# Patient Record
Sex: FEMALE | Race: WHITE | NOT HISPANIC OR LATINO | Employment: OTHER | URBAN - METROPOLITAN AREA
[De-identification: names, ages, dates, MRNs, and addresses within clinical notes are randomized per-mention and may not be internally consistent; named-entity substitution may affect disease eponyms.]

---

## 2023-11-19 ENCOUNTER — APPOINTMENT (OUTPATIENT)
Dept: RADIOLOGY | Facility: HOSPITAL | Age: 68
DRG: 660 | End: 2023-11-19
Payer: MEDICARE

## 2023-11-19 ENCOUNTER — HOSPITAL ENCOUNTER (INPATIENT)
Facility: HOSPITAL | Age: 68
LOS: 4 days | Discharge: HOME | DRG: 660 | End: 2023-11-23
Attending: EMERGENCY MEDICINE | Admitting: HOSPITALIST
Payer: MEDICARE

## 2023-11-19 DIAGNOSIS — N20.0 NEPHROLITHIASIS: ICD-10-CM

## 2023-11-19 DIAGNOSIS — N20.1 LEFT URETERAL STONE: ICD-10-CM

## 2023-11-19 DIAGNOSIS — I15.9 SECONDARY HYPERTENSION: ICD-10-CM

## 2023-11-19 DIAGNOSIS — N12 PYELONEPHRITIS: Primary | ICD-10-CM

## 2023-11-19 DIAGNOSIS — E03.9 HYPOTHYROIDISM, UNSPECIFIED TYPE: ICD-10-CM

## 2023-11-19 LAB
ALBUMIN SERPL BCP-MCNC: 3.8 G/DL (ref 3.4–5)
ALP SERPL-CCNC: 78 U/L (ref 33–136)
ALT SERPL W P-5'-P-CCNC: 9 U/L (ref 7–45)
ANION GAP SERPL CALC-SCNC: 17 MMOL/L (ref 10–20)
APPEARANCE UR: ABNORMAL
AST SERPL W P-5'-P-CCNC: 12 U/L (ref 9–39)
BACTERIA #/AREA URNS AUTO: ABNORMAL /HPF
BASOPHILS # BLD AUTO: 0.02 X10*3/UL (ref 0–0.1)
BASOPHILS NFR BLD AUTO: 0.2 %
BILIRUB SERPL-MCNC: 0.9 MG/DL (ref 0–1.2)
BILIRUB UR STRIP.AUTO-MCNC: NEGATIVE MG/DL
BUN SERPL-MCNC: 16 MG/DL (ref 6–23)
CALCIUM SERPL-MCNC: 8.7 MG/DL (ref 8.6–10.3)
CARDIAC TROPONIN I PNL SERPL HS: 12 NG/L (ref 0–13)
CHLORIDE SERPL-SCNC: 98 MMOL/L (ref 98–107)
CO2 SERPL-SCNC: 22 MMOL/L (ref 21–32)
COLOR UR: ABNORMAL
CREAT SERPL-MCNC: 0.77 MG/DL (ref 0.5–1.05)
EOSINOPHIL # BLD AUTO: 0 X10*3/UL (ref 0–0.7)
EOSINOPHIL NFR BLD AUTO: 0 %
ERYTHROCYTE [DISTWIDTH] IN BLOOD BY AUTOMATED COUNT: 15.3 % (ref 11.5–14.5)
FLUAV RNA RESP QL NAA+PROBE: NOT DETECTED
FLUBV RNA RESP QL NAA+PROBE: NOT DETECTED
GFR SERPL CREATININE-BSD FRML MDRD: 85 ML/MIN/1.73M*2
GLUCOSE SERPL-MCNC: 131 MG/DL (ref 74–99)
GLUCOSE UR STRIP.AUTO-MCNC: NEGATIVE MG/DL
HCT VFR BLD AUTO: 39.9 % (ref 36–46)
HGB BLD-MCNC: 12.8 G/DL (ref 12–16)
IMM GRANULOCYTES # BLD AUTO: 0.07 X10*3/UL (ref 0–0.7)
IMM GRANULOCYTES NFR BLD AUTO: 0.5 % (ref 0–0.9)
KETONES UR STRIP.AUTO-MCNC: ABNORMAL MG/DL
LACTATE SERPL-SCNC: 1.1 MMOL/L (ref 0.4–2)
LEUKOCYTE ESTERASE UR QL STRIP.AUTO: ABNORMAL
LIPASE SERPL-CCNC: 9 U/L (ref 9–82)
LYMPHOCYTES # BLD AUTO: 0.71 X10*3/UL (ref 1.2–4.8)
LYMPHOCYTES NFR BLD AUTO: 5.5 %
MAGNESIUM SERPL-MCNC: 2 MG/DL (ref 1.6–2.4)
MCH RBC QN AUTO: 26 PG (ref 26–34)
MCHC RBC AUTO-ENTMCNC: 32.1 G/DL (ref 32–36)
MCV RBC AUTO: 81 FL (ref 80–100)
MONOCYTES # BLD AUTO: 1.47 X10*3/UL (ref 0.1–1)
MONOCYTES NFR BLD AUTO: 11.4 %
MUCOUS THREADS #/AREA URNS AUTO: ABNORMAL /LPF
NEUTROPHILS # BLD AUTO: 10.66 X10*3/UL (ref 1.2–7.7)
NEUTROPHILS NFR BLD AUTO: 82.4 %
NITRITE UR QL STRIP.AUTO: NEGATIVE
NRBC BLD-RTO: 0 /100 WBCS (ref 0–0)
PH UR STRIP.AUTO: 5 [PH]
PLATELET # BLD AUTO: 288 X10*3/UL (ref 150–450)
POTASSIUM SERPL-SCNC: 3.7 MMOL/L (ref 3.5–5.3)
PROT SERPL-MCNC: 7.1 G/DL (ref 6.4–8.2)
PROT UR STRIP.AUTO-MCNC: ABNORMAL MG/DL
RBC # BLD AUTO: 4.93 X10*6/UL (ref 4–5.2)
RBC # UR STRIP.AUTO: ABNORMAL /UL
RBC #/AREA URNS AUTO: ABNORMAL /HPF
SARS-COV-2 RNA RESP QL NAA+PROBE: NOT DETECTED
SODIUM SERPL-SCNC: 133 MMOL/L (ref 136–145)
SP GR UR STRIP.AUTO: 1.02
SQUAMOUS #/AREA URNS AUTO: ABNORMAL /HPF
T4 FREE SERPL-MCNC: 0.96 NG/DL (ref 0.61–1.12)
TSH SERPL-ACNC: 4.96 MIU/L (ref 0.44–3.98)
UROBILINOGEN UR STRIP.AUTO-MCNC: 4 MG/DL
WBC # BLD AUTO: 12.9 X10*3/UL (ref 4.4–11.3)
WBC #/AREA URNS AUTO: >50 /HPF
WBC CLUMPS #/AREA URNS AUTO: ABNORMAL /HPF

## 2023-11-19 PROCEDURE — 94760 N-INVAS EAR/PLS OXIMETRY 1: CPT

## 2023-11-19 PROCEDURE — 99285 EMERGENCY DEPT VISIT HI MDM: CPT | Mod: 25 | Performed by: EMERGENCY MEDICINE

## 2023-11-19 PROCEDURE — 71046 X-RAY EXAM CHEST 2 VIEWS: CPT | Mod: FOREIGN READ | Performed by: RADIOLOGY

## 2023-11-19 PROCEDURE — 71046 X-RAY EXAM CHEST 2 VIEWS: CPT | Mod: FY,FR

## 2023-11-19 PROCEDURE — 87636 SARSCOV2 & INF A&B AMP PRB: CPT | Performed by: EMERGENCY MEDICINE

## 2023-11-19 PROCEDURE — 80053 COMPREHEN METABOLIC PANEL: CPT | Performed by: EMERGENCY MEDICINE

## 2023-11-19 PROCEDURE — 87186 SC STD MICRODIL/AGAR DIL: CPT | Mod: AHULAB | Performed by: EMERGENCY MEDICINE

## 2023-11-19 PROCEDURE — 96365 THER/PROPH/DIAG IV INF INIT: CPT

## 2023-11-19 PROCEDURE — 83690 ASSAY OF LIPASE: CPT | Performed by: EMERGENCY MEDICINE

## 2023-11-19 PROCEDURE — 2550000001 HC RX 255 CONTRASTS: Performed by: EMERGENCY MEDICINE

## 2023-11-19 PROCEDURE — 84484 ASSAY OF TROPONIN QUANT: CPT | Performed by: EMERGENCY MEDICINE

## 2023-11-19 PROCEDURE — 2500000004 HC RX 250 GENERAL PHARMACY W/ HCPCS (ALT 636 FOR OP/ED): Performed by: EMERGENCY MEDICINE

## 2023-11-19 PROCEDURE — 83735 ASSAY OF MAGNESIUM: CPT | Performed by: EMERGENCY MEDICINE

## 2023-11-19 PROCEDURE — 74177 CT ABD & PELVIS W/CONTRAST: CPT

## 2023-11-19 PROCEDURE — 83605 ASSAY OF LACTIC ACID: CPT | Performed by: EMERGENCY MEDICINE

## 2023-11-19 PROCEDURE — 1100000001 HC PRIVATE ROOM DAILY

## 2023-11-19 PROCEDURE — 84443 ASSAY THYROID STIM HORMONE: CPT | Performed by: EMERGENCY MEDICINE

## 2023-11-19 PROCEDURE — 85025 COMPLETE CBC W/AUTO DIFF WBC: CPT | Performed by: EMERGENCY MEDICINE

## 2023-11-19 PROCEDURE — 74177 CT ABD & PELVIS W/CONTRAST: CPT | Performed by: RADIOLOGY

## 2023-11-19 PROCEDURE — 36415 COLL VENOUS BLD VENIPUNCTURE: CPT | Performed by: EMERGENCY MEDICINE

## 2023-11-19 PROCEDURE — 87086 URINE CULTURE/COLONY COUNT: CPT | Mod: AHULAB | Performed by: EMERGENCY MEDICINE

## 2023-11-19 PROCEDURE — 81001 URINALYSIS AUTO W/SCOPE: CPT | Performed by: EMERGENCY MEDICINE

## 2023-11-19 PROCEDURE — 99223 1ST HOSP IP/OBS HIGH 75: CPT | Performed by: HOSPITALIST

## 2023-11-19 PROCEDURE — 84439 ASSAY OF FREE THYROXINE: CPT | Performed by: EMERGENCY MEDICINE

## 2023-11-19 RX ORDER — ACETAMINOPHEN 325 MG/1
975 TABLET ORAL ONCE
Status: COMPLETED | OUTPATIENT
Start: 2023-11-19 | End: 2023-11-19

## 2023-11-19 RX ORDER — CEFTRIAXONE 1 G/50ML
1 INJECTION, SOLUTION INTRAVENOUS ONCE
Status: COMPLETED | OUTPATIENT
Start: 2023-11-19 | End: 2023-11-19

## 2023-11-19 RX ORDER — ONDANSETRON HYDROCHLORIDE 2 MG/ML
4 INJECTION, SOLUTION INTRAVENOUS EVERY 6 HOURS PRN
Status: DISCONTINUED | OUTPATIENT
Start: 2023-11-19 | End: 2023-11-23 | Stop reason: HOSPADM

## 2023-11-19 RX ADMIN — ACETAMINOPHEN 975 MG: 325 TABLET ORAL at 16:32

## 2023-11-19 RX ADMIN — IOHEXOL 75 ML: 350 INJECTION, SOLUTION INTRAVENOUS at 18:51

## 2023-11-19 RX ADMIN — CEFTRIAXONE SODIUM 1 G: 1 INJECTION, SOLUTION INTRAVENOUS at 19:10

## 2023-11-19 RX ADMIN — SODIUM CHLORIDE 1000 ML: 9 INJECTION, SOLUTION INTRAVENOUS at 16:31

## 2023-11-19 SDOH — SOCIAL STABILITY: SOCIAL INSECURITY: HAS ANYONE EVER THREATENED TO HURT YOUR FAMILY OR YOUR PETS?: NO

## 2023-11-19 SDOH — SOCIAL STABILITY: SOCIAL INSECURITY: DO YOU FEEL ANYONE HAS EXPLOITED OR TAKEN ADVANTAGE OF YOU FINANCIALLY OR OF YOUR PERSONAL PROPERTY?: NO

## 2023-11-19 SDOH — SOCIAL STABILITY: SOCIAL INSECURITY: ABUSE: ADULT

## 2023-11-19 SDOH — SOCIAL STABILITY: SOCIAL INSECURITY: ARE THERE ANY APPARENT SIGNS OF INJURIES/BEHAVIORS THAT COULD BE RELATED TO ABUSE/NEGLECT?: NO

## 2023-11-19 SDOH — SOCIAL STABILITY: SOCIAL INSECURITY: DOES ANYONE TRY TO KEEP YOU FROM HAVING/CONTACTING OTHER FRIENDS OR DOING THINGS OUTSIDE YOUR HOME?: NO

## 2023-11-19 SDOH — SOCIAL STABILITY: SOCIAL INSECURITY: HAVE YOU HAD THOUGHTS OF HARMING ANYONE ELSE?: NO

## 2023-11-19 SDOH — SOCIAL STABILITY: SOCIAL INSECURITY: DO YOU FEEL UNSAFE GOING BACK TO THE PLACE WHERE YOU ARE LIVING?: NO

## 2023-11-19 SDOH — SOCIAL STABILITY: SOCIAL INSECURITY: WERE YOU ABLE TO COMPLETE ALL THE BEHAVIORAL HEALTH SCREENINGS?: YES

## 2023-11-19 SDOH — SOCIAL STABILITY: SOCIAL INSECURITY: ARE YOU OR HAVE YOU BEEN THREATENED OR ABUSED PHYSICALLY, EMOTIONALLY, OR SEXUALLY BY ANYONE?: NO

## 2023-11-19 ASSESSMENT — COGNITIVE AND FUNCTIONAL STATUS - GENERAL
STANDING UP FROM CHAIR USING ARMS: A LITTLE
MOVING TO AND FROM BED TO CHAIR: A LITTLE
WALKING IN HOSPITAL ROOM: A LOT
DAILY ACTIVITIY SCORE: 24
CLIMB 3 TO 5 STEPS WITH RAILING: A LOT
MOBILITY SCORE: 18
PATIENT BASELINE BEDBOUND: NO

## 2023-11-19 ASSESSMENT — COLUMBIA-SUICIDE SEVERITY RATING SCALE - C-SSRS
2. HAVE YOU ACTUALLY HAD ANY THOUGHTS OF KILLING YOURSELF?: NO
6. HAVE YOU EVER DONE ANYTHING, STARTED TO DO ANYTHING, OR PREPARED TO DO ANYTHING TO END YOUR LIFE?: NO
1. IN THE PAST MONTH, HAVE YOU WISHED YOU WERE DEAD OR WISHED YOU COULD GO TO SLEEP AND NOT WAKE UP?: NO

## 2023-11-19 ASSESSMENT — LIFESTYLE VARIABLES
AUDIT-C TOTAL SCORE: 0
HOW MANY STANDARD DRINKS CONTAINING ALCOHOL DO YOU HAVE ON A TYPICAL DAY: PATIENT DOES NOT DRINK
HOW OFTEN DO YOU HAVE A DRINK CONTAINING ALCOHOL: NEVER
SKIP TO QUESTIONS 9-10: 1
HOW OFTEN DO YOU HAVE 6 OR MORE DRINKS ON ONE OCCASION: NEVER
AUDIT-C TOTAL SCORE: 0

## 2023-11-19 ASSESSMENT — ACTIVITIES OF DAILY LIVING (ADL)
HEARING - LEFT EAR: FUNCTIONAL
FEEDING YOURSELF: INDEPENDENT
PATIENT'S MEMORY ADEQUATE TO SAFELY COMPLETE DAILY ACTIVITIES?: YES
DRESSING YOURSELF: INDEPENDENT
TOILETING: INDEPENDENT
ASSISTIVE_DEVICE: EYEGLASSES
JUDGMENT_ADEQUATE_SAFELY_COMPLETE_DAILY_ACTIVITIES: YES
BATHING: INDEPENDENT
LACK_OF_TRANSPORTATION: NO
HEARING - RIGHT EAR: FUNCTIONAL
GROOMING: INDEPENDENT
WALKS IN HOME: NEEDS ASSISTANCE
ADEQUATE_TO_COMPLETE_ADL: YES

## 2023-11-19 ASSESSMENT — PATIENT HEALTH QUESTIONNAIRE - PHQ9
SUM OF ALL RESPONSES TO PHQ9 QUESTIONS 1 & 2: 0
1. LITTLE INTEREST OR PLEASURE IN DOING THINGS: NOT AT ALL
2. FEELING DOWN, DEPRESSED OR HOPELESS: NOT AT ALL

## 2023-11-19 NOTE — ED PROVIDER NOTES
HPI   Chief Complaint   Patient presents with    Nausea     Pt has been having nausea and vomiting, feeling ill, hot and cold, started Friday. A&ox3.        HPI  Patient is a 68-year-old female with past medical history significant for anemia who presented to the emergency room with multiple complaints.  She states that since Friday she has felt generally ill.  She has been too weak to get out of bed.  She has nausea and vomiting that is nonbloody.  Last night she had a bowel movement that was nonbloody and nonmelanotic.  She has not checked for fever but has felt hot and cold.  She feels achy all over including her back.  She has some shortness of breath even at rest that is mild but denies any chest pain.  She has no cough, rhinorrhea.  She denies any dysuria or hematuria but states that her primary care doctor ordered a CT for her the other day to see if she had kidney stones.  She does not know the results.  At that time she was having increased frequency.  This was before she started having the symptoms she is describing today.      PMHx: As above  PSHx: Denies pertinent  FamilyHx: Denies  SocialHx: Denies  Allergies: NKDA  Medications: See Medication Reconciliation     ROS  As above but otherwise denies    Physical Exam    GENERAL: Awake and Alert, No Acute Distress  HEENT: AT/NC, PERRL, EOMI, Normal Oropharynx, No Signs of Dehydration  NECK: Normal Inspection, No JVD  CARDIOVASCULAR: TRR, No M/R/G  RESPIRATORY: CTA Bilaterally, No Wheezes, Rales or Rhonchi, Chest Wall Non-tender  ABDOMEN: Soft, non-tender abdomen, Normal Bowel Sounds, No Distention  BACK: No CVA Tenderness  SKIN: Normal Color, Warm, Dry, No Rashes   EXTREMITIES: Non-Tender, Full ROM, No Pedal Edema  NEURO: A&O x 3, Normal Motor and Sensation, Normal Mood and Affect    Nursing Assessment and Vitals Reviewed    EKG showed a sinus rhythm at 119 bpm.  There are no significant interval prolongations or ischemic ST changes.  No T wave inversions or  axis deviation.    Medical Decision  Patient is seen and evaluated for multiple symptoms as described above.  Physical exam reveals a tired but nontoxic-appearing female in no acute distress.  Lungs are clear and heart is tachycardic but regular.  Abdomen is soft, nontender nondistended.  She has some discomfort to the flanks.  Patient is started on IV fluids due to concern for severe nausea and vomiting.  Will obtain blood work, COVID swabs to evaluate for possible etiology of symptoms.  Additionally, patient is experiencing chills, extreme fatigue, back pain and is tachycardic with a low-grade temperature.  Concern for possible Harmeet so we will add a CT of the abdomen and pelvis.    Work-up for patient included labs that revealed mild hyponatremia.  TSH is elevated but T4 is within normal limits.  She has mild leukocytosis with a left shift and her urine does appear infected so she is started on IV antibiotics.  CT of the abdomen and pelvis showed multiple perinephric no which she has areas of hypo enhancement concerning for pyelonephritis.  Additionally, she has nonobstructing calculi in the left distal ureter as well as others.  Bladder is concerning for infectious cystitis.  She also has diverticulosis without diverticulitis and cholelithiasis.  Patient has remained in stable condition while in the emergency room.  She will be admitted for further work-up and management.                                No data recorded                Patient History   No past medical history on file.  No past surgical history on file.  No family history on file.  Social History     Tobacco Use    Smoking status: Not on file    Smokeless tobacco: Not on file   Substance Use Topics    Alcohol use: Not on file    Drug use: Not on file       Physical Exam   ED Triage Vitals   Temp Heart Rate Resp BP   11/19/23 1308 11/19/23 1307 11/19/23 1307 11/19/23 1307   37.7 °C (99.8 °F) (!) 118 18 (!) 147/97      SpO2 Temp src Heart Rate Source  Patient Position   11/19/23 1307 -- -- --   96 %         BP Location FiO2 (%)     -- --             Physical Exam    ED Course & MDM        Medical Decision Making      Procedure  Procedures     Franchesca Mcbride MD  11/19/23 0832

## 2023-11-20 PROBLEM — I10 HTN (HYPERTENSION): Status: ACTIVE | Noted: 2023-11-20

## 2023-11-20 PROBLEM — E03.9 HYPOTHYROIDISM: Status: ACTIVE | Noted: 2023-11-20

## 2023-11-20 LAB
ANION GAP SERPL CALC-SCNC: 13 MMOL/L (ref 10–20)
BUN SERPL-MCNC: 12 MG/DL (ref 6–23)
CALCIUM SERPL-MCNC: 8 MG/DL (ref 8.6–10.3)
CHLORIDE SERPL-SCNC: 103 MMOL/L (ref 98–107)
CO2 SERPL-SCNC: 23 MMOL/L (ref 21–32)
CREAT SERPL-MCNC: 0.63 MG/DL (ref 0.5–1.05)
ERYTHROCYTE [DISTWIDTH] IN BLOOD BY AUTOMATED COUNT: 15.2 % (ref 11.5–14.5)
GFR SERPL CREATININE-BSD FRML MDRD: >90 ML/MIN/1.73M*2
GLUCOSE BLD MANUAL STRIP-MCNC: 135 MG/DL (ref 74–99)
GLUCOSE SERPL-MCNC: 133 MG/DL (ref 74–99)
HCT VFR BLD AUTO: 42.3 % (ref 36–46)
HGB BLD-MCNC: 13 G/DL (ref 12–16)
MCH RBC QN AUTO: 25.7 PG (ref 26–34)
MCHC RBC AUTO-ENTMCNC: 30.7 G/DL (ref 32–36)
MCV RBC AUTO: 84 FL (ref 80–100)
NRBC BLD-RTO: 0 /100 WBCS (ref 0–0)
PLATELET # BLD AUTO: 248 X10*3/UL (ref 150–450)
POTASSIUM SERPL-SCNC: 3.4 MMOL/L (ref 3.5–5.3)
RBC # BLD AUTO: 5.05 X10*6/UL (ref 4–5.2)
SODIUM SERPL-SCNC: 136 MMOL/L (ref 136–145)
WBC # BLD AUTO: 11.1 X10*3/UL (ref 4.4–11.3)

## 2023-11-20 PROCEDURE — 2500000001 HC RX 250 WO HCPCS SELF ADMINISTERED DRUGS (ALT 637 FOR MEDICARE OP): Performed by: HOSPITALIST

## 2023-11-20 PROCEDURE — 1100000001 HC PRIVATE ROOM DAILY

## 2023-11-20 PROCEDURE — 80048 BASIC METABOLIC PNL TOTAL CA: CPT | Performed by: HOSPITALIST

## 2023-11-20 PROCEDURE — 2500000004 HC RX 250 GENERAL PHARMACY W/ HCPCS (ALT 636 FOR OP/ED): Performed by: INTERNAL MEDICINE

## 2023-11-20 PROCEDURE — 99232 SBSQ HOSP IP/OBS MODERATE 35: CPT | Performed by: INTERNAL MEDICINE

## 2023-11-20 PROCEDURE — 94760 N-INVAS EAR/PLS OXIMETRY 1: CPT

## 2023-11-20 PROCEDURE — 36415 COLL VENOUS BLD VENIPUNCTURE: CPT | Performed by: HOSPITALIST

## 2023-11-20 PROCEDURE — 85027 COMPLETE CBC AUTOMATED: CPT | Performed by: HOSPITALIST

## 2023-11-20 PROCEDURE — 2500000001 HC RX 250 WO HCPCS SELF ADMINISTERED DRUGS (ALT 637 FOR MEDICARE OP): Performed by: INTERNAL MEDICINE

## 2023-11-20 PROCEDURE — 2500000004 HC RX 250 GENERAL PHARMACY W/ HCPCS (ALT 636 FOR OP/ED): Performed by: HOSPITALIST

## 2023-11-20 PROCEDURE — 99221 1ST HOSP IP/OBS SF/LOW 40: CPT | Performed by: NURSE PRACTITIONER

## 2023-11-20 PROCEDURE — C9113 INJ PANTOPRAZOLE SODIUM, VIA: HCPCS | Performed by: HOSPITALIST

## 2023-11-20 PROCEDURE — 82947 ASSAY GLUCOSE BLOOD QUANT: CPT

## 2023-11-20 RX ORDER — TALC
3 POWDER (GRAM) TOPICAL DAILY
Status: DISCONTINUED | OUTPATIENT
Start: 2023-11-20 | End: 2023-11-23 | Stop reason: HOSPADM

## 2023-11-20 RX ORDER — MORPHINE SULFATE 2 MG/ML
2 INJECTION, SOLUTION INTRAMUSCULAR; INTRAVENOUS EVERY 4 HOURS PRN
Status: DISCONTINUED | OUTPATIENT
Start: 2023-11-20 | End: 2023-11-23 | Stop reason: HOSPADM

## 2023-11-20 RX ORDER — ONDANSETRON HYDROCHLORIDE 2 MG/ML
4 INJECTION, SOLUTION INTRAVENOUS EVERY 8 HOURS PRN
Status: DISCONTINUED | OUTPATIENT
Start: 2023-11-20 | End: 2023-11-23 | Stop reason: HOSPADM

## 2023-11-20 RX ORDER — CEFTRIAXONE 1 G/50ML
1 INJECTION, SOLUTION INTRAVENOUS EVERY 24 HOURS
Status: DISCONTINUED | OUTPATIENT
Start: 2023-11-20 | End: 2023-11-20

## 2023-11-20 RX ORDER — ONDANSETRON 4 MG/1
4 TABLET, ORALLY DISINTEGRATING ORAL EVERY 8 HOURS PRN
Status: DISCONTINUED | OUTPATIENT
Start: 2023-11-20 | End: 2023-11-23 | Stop reason: HOSPADM

## 2023-11-20 RX ORDER — TAMSULOSIN HYDROCHLORIDE 0.4 MG/1
0.4 CAPSULE ORAL DAILY
Status: DISCONTINUED | OUTPATIENT
Start: 2023-11-20 | End: 2023-11-23 | Stop reason: HOSPADM

## 2023-11-20 RX ORDER — ACETAMINOPHEN 325 MG/1
650 TABLET ORAL EVERY 4 HOURS PRN
Status: DISCONTINUED | OUTPATIENT
Start: 2023-11-20 | End: 2023-11-23 | Stop reason: HOSPADM

## 2023-11-20 RX ORDER — KETOROLAC TROMETHAMINE 30 MG/ML
15 INJECTION, SOLUTION INTRAMUSCULAR; INTRAVENOUS ONCE
Status: COMPLETED | OUTPATIENT
Start: 2023-11-20 | End: 2023-11-20

## 2023-11-20 RX ORDER — SODIUM CHLORIDE 9 MG/ML
125 INJECTION, SOLUTION INTRAVENOUS CONTINUOUS
Status: DISCONTINUED | OUTPATIENT
Start: 2023-11-20 | End: 2023-11-21

## 2023-11-20 RX ORDER — POLYETHYLENE GLYCOL 3350 17 G/17G
17 POWDER, FOR SOLUTION ORAL DAILY
Status: DISCONTINUED | OUTPATIENT
Start: 2023-11-20 | End: 2023-11-23 | Stop reason: HOSPADM

## 2023-11-20 RX ORDER — PANTOPRAZOLE SODIUM 40 MG/10ML
40 INJECTION, POWDER, LYOPHILIZED, FOR SOLUTION INTRAVENOUS DAILY
Status: DISCONTINUED | OUTPATIENT
Start: 2023-11-20 | End: 2023-11-23 | Stop reason: HOSPADM

## 2023-11-20 RX ORDER — POTASSIUM CHLORIDE 1.5 G/1.58G
40 POWDER, FOR SOLUTION ORAL ONCE
Status: COMPLETED | OUTPATIENT
Start: 2023-11-20 | End: 2023-11-20

## 2023-11-20 RX ORDER — MORPHINE SULFATE 2 MG/ML
1 INJECTION, SOLUTION INTRAMUSCULAR; INTRAVENOUS EVERY 4 HOURS PRN
Status: DISCONTINUED | OUTPATIENT
Start: 2023-11-20 | End: 2023-11-23 | Stop reason: HOSPADM

## 2023-11-20 RX ORDER — LEVOTHYROXINE SODIUM 25 UG/1
25 TABLET ORAL DAILY
Status: DISCONTINUED | OUTPATIENT
Start: 2023-11-20 | End: 2023-11-23 | Stop reason: HOSPADM

## 2023-11-20 RX ADMIN — ACETAMINOPHEN 650 MG: 325 TABLET ORAL at 09:58

## 2023-11-20 RX ADMIN — LEVOTHYROXINE SODIUM 25 MCG: 0.03 TABLET ORAL at 05:33

## 2023-11-20 RX ADMIN — POTASSIUM CHLORIDE 40 MEQ: 1.5 POWDER, FOR SOLUTION ORAL at 09:58

## 2023-11-20 RX ADMIN — Medication 3 MG: at 00:57

## 2023-11-20 RX ADMIN — CEFTRIAXONE 2 G: 2 INJECTION, POWDER, FOR SOLUTION INTRAMUSCULAR; INTRAVENOUS at 11:39

## 2023-11-20 RX ADMIN — SODIUM CHLORIDE 200 ML/HR: 9 INJECTION, SOLUTION INTRAVENOUS at 19:31

## 2023-11-20 RX ADMIN — KETOROLAC TROMETHAMINE 15 MG: 30 INJECTION, SOLUTION INTRAMUSCULAR; INTRAVENOUS at 19:29

## 2023-11-20 RX ADMIN — POLYETHYLENE GLYCOL 3350 17 G: 17 POWDER, FOR SOLUTION ORAL at 09:58

## 2023-11-20 RX ADMIN — SODIUM CHLORIDE 125 ML/HR: 9 INJECTION, SOLUTION INTRAVENOUS at 00:31

## 2023-11-20 RX ADMIN — Medication 3 MG: at 19:51

## 2023-11-20 RX ADMIN — SODIUM CHLORIDE 200 ML/HR: 9 INJECTION, SOLUTION INTRAVENOUS at 14:19

## 2023-11-20 RX ADMIN — PANTOPRAZOLE SODIUM 40 MG: 40 INJECTION, POWDER, FOR SOLUTION INTRAVENOUS at 09:59

## 2023-11-20 RX ADMIN — ACETAMINOPHEN 650 MG: 325 TABLET ORAL at 00:57

## 2023-11-20 RX ADMIN — TAMSULOSIN HYDROCHLORIDE 0.4 MG: 0.4 CAPSULE ORAL at 09:59

## 2023-11-20 RX ADMIN — ACETAMINOPHEN 650 MG: 325 TABLET ORAL at 14:19

## 2023-11-20 ASSESSMENT — COGNITIVE AND FUNCTIONAL STATUS - GENERAL
MOBILITY SCORE: 22
DAILY ACTIVITIY SCORE: 24
MOBILITY SCORE: 18
STANDING UP FROM CHAIR USING ARMS: A LITTLE
CLIMB 3 TO 5 STEPS WITH RAILING: A LOT
WALKING IN HOSPITAL ROOM: A LOT
DAILY ACTIVITIY SCORE: 24
CLIMB 3 TO 5 STEPS WITH RAILING: A LOT
MOVING TO AND FROM BED TO CHAIR: A LITTLE

## 2023-11-20 ASSESSMENT — ENCOUNTER SYMPTOMS
EYES NEGATIVE: 1
MUSCULOSKELETAL NEGATIVE: 1
ABDOMINAL DISTENTION: 0
RESPIRATORY NEGATIVE: 1
WEAKNESS: 1
HEMATURIA: 0
CHILLS: 1
SHORTNESS OF BREATH: 0
GASTROINTESTINAL NEGATIVE: 1
APPETITE CHANGE: 1
FATIGUE: 1
FEVER: 1
DIAPHORESIS: 0
DIARRHEA: 0
VOMITING: 0
WOUND: 0
HEMATOLOGIC/LYMPHATIC NEGATIVE: 1
FLANK PAIN: 0
ABDOMINAL PAIN: 0
DYSURIA: 0
CARDIOVASCULAR NEGATIVE: 1
COUGH: 0
NAUSEA: 1
ARTHRALGIAS: 0
DIZZINESS: 0
CONSTIPATION: 0
ALLERGIC/IMMUNOLOGIC NEGATIVE: 1
HEADACHES: 0
PSYCHIATRIC NEGATIVE: 1
LIGHT-HEADEDNESS: 0
DIFFICULTY URINATING: 0
FREQUENCY: 0
NEUROLOGICAL NEGATIVE: 1

## 2023-11-20 ASSESSMENT — PAIN - FUNCTIONAL ASSESSMENT
PAIN_FUNCTIONAL_ASSESSMENT: 0-10

## 2023-11-20 ASSESSMENT — PAIN SCALES - GENERAL
PAINLEVEL_OUTOF10: 8
PAINLEVEL_OUTOF10: 0 - NO PAIN
PAINLEVEL_OUTOF10: 6
PAINLEVEL_OUTOF10: 3

## 2023-11-20 ASSESSMENT — ACTIVITIES OF DAILY LIVING (ADL): LACK_OF_TRANSPORTATION: NO

## 2023-11-20 NOTE — PROGRESS NOTES
"Tabatha Pascal is a 68 y.o. female on day 1 of admission presenting with Pyelonephritis.    Assessment/Plan        Principal Problem:    Pyelonephritis  Active Problems:    Nephrolithiasis    Diabetes (CMS/HCC)    HTN (hypertension)    Hypothyroidism  - rocephin 2 g  - no stones are clearly obstructing, but will do IV fluids and flomax  - pain control  - urology consulted            Subjective   Still with pelvic and flank pain; doesn't feel much better yet.        Objective     Physical Exam  Cardiovascular:      Rate and Rhythm: Normal rate and regular rhythm.      Heart sounds: Normal heart sounds.   Pulmonary:      Breath sounds: Normal breath sounds.   Abdominal:      General: Bowel sounds are normal.      Palpations: Abdomen is soft.   Musculoskeletal:         General: Normal range of motion.   Neurological:      General: No focal deficit present.      Mental Status: She is alert and oriented to person, place, and time.   Psychiatric:         Mood and Affect: Mood normal.         Last Recorded Vitals  Blood pressure 117/76, pulse 85, temperature 36.1 °C (96.9 °F), temperature source Temporal, resp. rate 20, height 1.676 m (5' 6\"), weight 74.8 kg (165 lb), SpO2 97 %.  Intake/Output last 3 Shifts:  I/O last 3 completed shifts:  In: 300 (4 mL/kg) [P.O.:300]  Out: - (0 mL/kg)   Weight: 74.8 kg                  I spent 40 minutes in the professional and overall care of this patient.      Domingo Fournier MD      "

## 2023-11-20 NOTE — CONSULTS
"Reason For Consult  Kidney stones    History Of Present Illness  Tabatha Pascal is a 68 y.o. female presenting with fatigue, subjective fever, chills, nausea and urinary frequency that started on Friday. She has hx of kidney stones in the past but is unsure of whether or not she needed intervention for it.  She follows with Dr Castillo and is schedule to see him on the 28th for her annual follow up. She denies pain, CP, SOB, vomiting, dysuria, hematuria and frequency. Ct scan shows multiple wedge shaped areas of hypoenhancement, perinephric stranding, Findings consistent with acute pyelonephritis, as well as, 3-4mm non obstructing calculus in the left distal ureter.\" WBC on admission 12.9, UA + for Lg LE, small blood, 4+ bacteria, culture pending, and tachy in 130s. CR is WNL      Past Medical History  HTN, DM, hypothyroidism, and nephrolithiasis.     Surgical History  She has no past surgical history on file.     Social History  She reports that she has never smoked. She has never used smokeless tobacco. No history on file for alcohol use and drug use.    Family History  No family history on file.     Allergies  Patient has no known allergies.    Review of Systems  Review of Systems   Constitutional:  Positive for chills, fatigue and fever. Negative for diaphoresis.   HENT:  Negative for congestion.    Respiratory:  Negative for cough and shortness of breath.    Cardiovascular:  Negative for chest pain and leg swelling.   Gastrointestinal:  Positive for nausea. Negative for abdominal distention, abdominal pain, constipation, diarrhea and vomiting.   Genitourinary:  Positive for urgency. Negative for decreased urine volume, difficulty urinating, dysuria, flank pain, frequency and hematuria.   Musculoskeletal:  Negative for arthralgias.   Skin:  Negative for rash and wound.   Neurological:  Positive for weakness. Negative for dizziness, light-headedness and headaches.         Physical Exam  Physical " "Exam:  Constitutional: ill appearing, Well developed, awake/alert/oriented x3, no distress, alert and cooperative. Sitting up in bed  Skin: Warm and dry, no lesions, no rashes  Eyes: PEERLA  ENMT: mucous membranes moist, no apparent injury, no lesions seen  Head/Neck: Neck supple, no apparent injury, trachea midline  Respiratory/Thorax: Patent airways, CTAB, normal breath sounds with good chest expansion, thorax symmetric  Cardiovascular: Regular, rate and rhythm, 2+ equal pulses of the extremities  Gastrointestinal: Non-distended, soft, non-tender, +BS  Genitourinary: voiding without difficulty, non distended bladder  Musculoskeletal: ROM intact, no joint swelling, normal strength.   Extremities: no cyanosis edema, contusions or wounds   Neurological: alert and oriented x3, intact senses  Psychological: Appropriate mood and behavior.      Last Recorded Vitals  Blood pressure 117/76, pulse 85, temperature 36.1 °C (96.9 °F), temperature source Temporal, resp. rate 20, height 1.676 m (5' 6\"), weight 74.8 kg (165 lb), SpO2 97 %.    Relevant Results  Results for orders placed or performed during the hospital encounter of 11/19/23 (from the past 24 hour(s))   CBC and Auto Differential   Result Value Ref Range    WBC 12.9 (H) 4.4 - 11.3 x10*3/uL    nRBC 0.0 0.0 - 0.0 /100 WBCs    RBC 4.93 4.00 - 5.20 x10*6/uL    Hemoglobin 12.8 12.0 - 16.0 g/dL    Hematocrit 39.9 36.0 - 46.0 %    MCV 81 80 - 100 fL    MCH 26.0 26.0 - 34.0 pg    MCHC 32.1 32.0 - 36.0 g/dL    RDW 15.3 (H) 11.5 - 14.5 %    Platelets 288 150 - 450 x10*3/uL    Neutrophils % 82.4 40.0 - 80.0 %    Immature Granulocytes %, Automated 0.5 0.0 - 0.9 %    Lymphocytes % 5.5 13.0 - 44.0 %    Monocytes % 11.4 2.0 - 10.0 %    Eosinophils % 0.0 0.0 - 6.0 %    Basophils % 0.2 0.0 - 2.0 %    Neutrophils Absolute 10.66 (H) 1.20 - 7.70 x10*3/uL    Immature Granulocytes Absolute, Automated 0.07 0.00 - 0.70 x10*3/uL    Lymphocytes Absolute 0.71 (L) 1.20 - 4.80 x10*3/uL    " Monocytes Absolute 1.47 (H) 0.10 - 1.00 x10*3/uL    Eosinophils Absolute 0.00 0.00 - 0.70 x10*3/uL    Basophils Absolute 0.02 0.00 - 0.10 x10*3/uL   Magnesium   Result Value Ref Range    Magnesium 2.00 1.60 - 2.40 mg/dL   Comprehensive metabolic panel   Result Value Ref Range    Glucose 131 (H) 74 - 99 mg/dL    Sodium 133 (L) 136 - 145 mmol/L    Potassium 3.7 3.5 - 5.3 mmol/L    Chloride 98 98 - 107 mmol/L    Bicarbonate 22 21 - 32 mmol/L    Anion Gap 17 10 - 20 mmol/L    Urea Nitrogen 16 6 - 23 mg/dL    Creatinine 0.77 0.50 - 1.05 mg/dL    eGFR 85 >60 mL/min/1.73m*2    Calcium 8.7 8.6 - 10.3 mg/dL    Albumin 3.8 3.4 - 5.0 g/dL    Alkaline Phosphatase 78 33 - 136 U/L    Total Protein 7.1 6.4 - 8.2 g/dL    AST 12 9 - 39 U/L    Bilirubin, Total 0.9 0.0 - 1.2 mg/dL    ALT 9 7 - 45 U/L   Lactate   Result Value Ref Range    Lactate 1.1 0.4 - 2.0 mmol/L   Lipase   Result Value Ref Range    Lipase 9 9 - 82 U/L   TSH with reflex to Free T4 if abnormal   Result Value Ref Range    Thyroid Stimulating Hormone 4.96 (H) 0.44 - 3.98 mIU/L   Troponin I, High Sensitivity   Result Value Ref Range    Troponin I, High Sensitivity 12 0 - 13 ng/L   Thyroxine, Free   Result Value Ref Range    Thyroxine, Free 0.96 0.61 - 1.12 ng/dL   SARS-CoV-2 RT PCR   Result Value Ref Range    Coronavirus 2019, PCR Not Detected Not Detected   Influenza A, and B PCR   Result Value Ref Range    Flu A Result Not Detected Not Detected    Flu B Result Not Detected Not Detected   Urinalysis with Reflex Microscopic and Culture   Result Value Ref Range    Color, Urine Idania (N) Straw, Yellow    Appearance, Urine Cloudy (N) Clear    Specific Gravity, Urine 1.016 1.005 - 1.035    pH, Urine 5.0 5.0, 5.5, 6.0, 6.5, 7.0, 7.5, 8.0    Protein, Urine 100 (2+) (N) NEGATIVE mg/dL    Glucose, Urine NEGATIVE NEGATIVE mg/dL    Blood, Urine SMALL (1+) (A) NEGATIVE    Ketones, Urine 20 (1+) (A) NEGATIVE mg/dL    Bilirubin, Urine NEGATIVE NEGATIVE    Urobilinogen, Urine 4.0  (N) <2.0 mg/dL    Nitrite, Urine NEGATIVE NEGATIVE    Leukocyte Esterase, Urine LARGE (3+) (A) NEGATIVE   Microscopic Only, Urine   Result Value Ref Range    WBC, Urine >50 (A) 1-5, NONE /HPF    WBC Clumps, Urine MANY Reference range not established. /HPF    RBC, Urine 6-10 (A) NONE, 1-2, 3-5 /HPF    Squamous Epithelial Cells, Urine 1-9 (SPARSE) Reference range not established. /HPF    Bacteria, Urine 4+ (A) NONE SEEN /HPF    Mucus, Urine 3+ Reference range not established. /LPF   CBC   Result Value Ref Range    WBC 11.1 4.4 - 11.3 x10*3/uL    nRBC 0.0 0.0 - 0.0 /100 WBCs    RBC 5.05 4.00 - 5.20 x10*6/uL    Hemoglobin 13.0 12.0 - 16.0 g/dL    Hematocrit 42.3 36.0 - 46.0 %    MCV 84 80 - 100 fL    MCH 25.7 (L) 26.0 - 34.0 pg    MCHC 30.7 (L) 32.0 - 36.0 g/dL    RDW 15.2 (H) 11.5 - 14.5 %    Platelets 248 150 - 450 x10*3/uL   Basic metabolic panel   Result Value Ref Range    Glucose 133 (H) 74 - 99 mg/dL    Sodium 136 136 - 145 mmol/L    Potassium 3.4 (L) 3.5 - 5.3 mmol/L    Chloride 103 98 - 107 mmol/L    Bicarbonate 23 21 - 32 mmol/L    Anion Gap 13 10 - 20 mmol/L    Urea Nitrogen 12 6 - 23 mg/dL    Creatinine 0.63 0.50 - 1.05 mg/dL    eGFR >90 >60 mL/min/1.73m*2    Calcium 8.0 (L) 8.6 - 10.3 mg/dL   POCT GLUCOSE   Result Value Ref Range    POCT Glucose 135 (H) 74 - 99 mg/dL     CT abdomen pelvis w IV contrast    Result Date: 11/19/2023  Interpreted By:  Lis Jackson, STUDY: CT ABDOMEN PELVIS W IV CONTRAST;  11/19/2023 7:09 pm   INDICATION: Signs/Symptoms:concern for pyelo.   COMPARISON: CT scan of the abdomen pelvis 06/11/2020   ACCESSION NUMBER(S): [id number]   ORDERING CLINICIAN: VEE WEBBER   TECHNIQUE: Axial CT images of the abdomen and pelvis with coronal and sagittal reconstructed images obtained after intravenous administration of 75 mL of Omnipaque 350.   FINDINGS: LOWER CHEST: Bibasilar atelectasis and or scarring. Large hiatal hernia with significant portion of the stomach  intrathoracic in location.   ABDOMEN:   LIVER: Within normal limits. BILE DUCTS: Normal caliber. GALLBLADDER: Cholelithiasis. PANCREAS: Within normal limits. SPLEEN: Within normal limits. ADRENALS: Within normal limits. KIDNEYS and URETERS: Bilateral kidneys demonstrate wedge-shaped areas of hypoenhancement with perinephric inflammatory stranding. Findings are concerning for acute pyelonephritis. No hydronephrosis or hydroureter. There is a 2-3 mm nonobstructing calculus in the interpolar region of the left kidney. A 4 mm nonobstructing calculus is noted in the left distal ureter. No evidence for right renal or ureteral calculi.   VESSELS:  Calcific atherosclerosis of the aortoiliac vessels. No aortic aneurysm. RETROPERITONEUM: No pathologically enlarged retroperitoneal lymph nodes.   PELVIS:   REPRODUCTIVE ORGANS: Uterus is present. No adnexal mass. BLADDER: Bladder is partially distended with mild wall thickening and several locules of air. Subtle perivesical stranding   BOWEL: Large hiatal hernia. Visualized loops of bowel are without evidence for obstruction. Moderate stool burden. Scattered colonic diverticula. No evidence for acute diverticulitis. No pneumatosis or portal venous gas. Normal appendix. PERITONEUM: No ascites or free air, no fluid collection.   ABDOMINAL WALL: Small umbilical hernia contains fat. Patulous inguinal canals containing fat. BONES: Multilevel degenerative changes of the spine. Grade 1 anterolisthesis of L4 on 5.       There are multiple peripheral wedge-shaped areas of hypoenhancement bilateral kidneys with perinephric inflammatory stranding. Mild urothelial thickening. Findings concerning for acute pyelonephritis.   There is a 3-4 mm nonobstructing calculus in the left distal ureter. Additional punctate 2-3 mm calculus is noted in the interpolar region of the left kidney.   Bladder is partially distended with mild wall thickening and subtle perivesical stranding. Subtle tiny locules  of air are noted in the bladder. Correlate for history of any recent instrumentation. In the absence of such history findings are concerning for infectious cystitis. Correlate with urinalysis.   Diverticulosis without evidence for acute diverticulitis.   Cholelithiasis.   Additional findings as described above.   MACRO: None   Signed by: Lis Jackson 11/19/2023 7:39 PM Dictation workstation:   [id number]    XR chest 2 views    Result Date: 11/19/2023  STUDY: Chest Radiographs;  11/19/2023, 3:18PM. INDICATION: Shortness of breath. COMPARISON: CXR 6/11/2020. ACCESSION NUMBER(S): [id number] ORDERING CLINICIAN: VEE WEBBER TECHNIQUE:  Frontal and lateral chest. FINDINGS: CARDIOMEDIASTINAL SILHOUETTE: Cardiomediastinal silhouette is normal in size and configuration.  LUNGS: Lungs are clear.  ABDOMEN: No remarkable upper abdominal findings.  BONES: No acute osseous changes.    No acute pulmonary pathology. Signed by Blaine Garcias MD        Assessment/Plan     Tabatha Pascal is a 67 yo female who presents with chief complaint of not feeling well, fever, chills, fatigue and urinary urgency. On exam, she is ill-appearing, abdomen and bladder are no distended, no tenderness with palpation.     Impression/Plan: Pyelonephritis/ UTI/non obstructing kidney stone   - May eat today  - NPO after midnight  - CT scan tomorrow to evaluate stone   - continue abx for UTI/ pyelo  - IVF  - continue flomax  - pain and nausea medications as needed  - repeat labs in the AM  - supportive care per primary team    Dispo: Repeat CT scan in the morning to evaluate the location of the stone. This along with her labs and exam will determine need for stent placement tomorrow. NPO after MN. Urology will continue to follow. Discussed with Dr Jerica Abdi    I spent 45 minutes in the professional and overall care of this patient.      ТАТЬЯНА Barnett-CNP

## 2023-11-20 NOTE — H&P
History Of Present Illness  Tabatha Pascal is a 68 y.o. female with a PMH of hypothyroidism, HLD, GERD, DM2, HTN, presenting with fever, chills, dec appetite.  Symptoms started 3 days PTA, on Friday.   Started having fever and shaking chills on Friday; the following day developed nausea and dry heaves.   Did not have urinary frequency or dysuria, however, did have urgency with a rush to get to the toilet.   No gross blood in the urine, however, she felt it looked darker than normal.   Has had no appetite, has not eaten in days.     She also reports that she has not been taking any of her medication for months. States she feels better off the medications.   Has a hx of kidney stones, for which she sees a Urologist Dr Brown at [facility].     Work up in the ED shows   Elevated WBC ct of 12.9  Cr WNL  UA- large LE, 4+ bacteria    CT A/P shows bladder wall thickening, findings c/w pyelonephritis, non-obstructing stone distal left ureter.     Past Medical History  No past medical history on file.    Surgical History  No past surgical history on file.     Social History  She has no history on file for tobacco use, alcohol use, and drug use.    Family History  No family history on file.     Allergies  Patient has no known allergies.    Review of Systems   Constitutional:  Positive for appetite change, chills and fever.   HENT: Negative.     Eyes: Negative.    Respiratory: Negative.     Cardiovascular: Negative.    Gastrointestinal: Negative.    Genitourinary:  Positive for urgency. Negative for dysuria, flank pain and frequency.   Musculoskeletal: Negative.    Skin: Negative.    Allergic/Immunologic: Negative.    Neurological: Negative.    Hematological: Negative.    Psychiatric/Behavioral: Negative.          Physical Exam  Vitals reviewed.   Constitutional:       Comments: Pt lying in fetal position, left lateral decubitus position, is shaking, states she does not feel well. Attempts to vomit several times while I am  "speaking with her, however, is not able to vomit.      HENT:      Head: Normocephalic and atraumatic.      Mouth/Throat:      Mouth: Mucous membranes are moist.   Eyes:      Extraocular Movements: Extraocular movements intact.      Conjunctiva/sclera: Conjunctivae normal.      Pupils: Pupils are equal, round, and reactive to light.   Cardiovascular:      Rate and Rhythm: Normal rate and regular rhythm.      Pulses: Normal pulses.      Heart sounds: Normal heart sounds.   Pulmonary:      Effort: Pulmonary effort is normal.      Breath sounds: Normal breath sounds.   Abdominal:      General: Abdomen is flat.      Palpations: Abdomen is soft.      Tenderness: There is abdominal tenderness.      Comments: Abdomen soft, hypoactive bowel sounds  Tender in suprapubic area. Not distended.      Genitourinary:     Comments: No CVA tenderness.   Musculoskeletal:         General: Normal range of motion.   Skin:     General: Skin is warm and dry.   Neurological:      General: No focal deficit present.      Mental Status: She is alert and oriented to person, place, and time.   Psychiatric:         Mood and Affect: Mood normal.         Behavior: Behavior normal.         Thought Content: Thought content normal.         Judgment: Judgment normal.          Last Recorded Vitals  Blood pressure 147/85, pulse 90, temperature 37.7 °C (99.8 °F), resp. rate 23, height 1.676 m (5' 6\"), weight 72.6 kg (160 lb), SpO2 97 %.    Relevant Results        Results for orders placed or performed during the hospital encounter of 11/19/23 (from the past 24 hour(s))   CBC and Auto Differential   Result Value Ref Range    WBC 12.9 (H) 4.4 - 11.3 x10*3/uL    nRBC 0.0 0.0 - 0.0 /100 WBCs    RBC 4.93 4.00 - 5.20 x10*6/uL    Hemoglobin 12.8 12.0 - 16.0 g/dL    Hematocrit 39.9 36.0 - 46.0 %    MCV 81 80 - 100 fL    MCH 26.0 26.0 - 34.0 pg    MCHC 32.1 32.0 - 36.0 g/dL    RDW 15.3 (H) 11.5 - 14.5 %    Platelets 288 150 - 450 x10*3/uL    Neutrophils % 82.4 40.0 " - 80.0 %    Immature Granulocytes %, Automated 0.5 0.0 - 0.9 %    Lymphocytes % 5.5 13.0 - 44.0 %    Monocytes % 11.4 2.0 - 10.0 %    Eosinophils % 0.0 0.0 - 6.0 %    Basophils % 0.2 0.0 - 2.0 %    Neutrophils Absolute 10.66 (H) 1.20 - 7.70 x10*3/uL    Immature Granulocytes Absolute, Automated 0.07 0.00 - 0.70 x10*3/uL    Lymphocytes Absolute 0.71 (L) 1.20 - 4.80 x10*3/uL    Monocytes Absolute 1.47 (H) 0.10 - 1.00 x10*3/uL    Eosinophils Absolute 0.00 0.00 - 0.70 x10*3/uL    Basophils Absolute 0.02 0.00 - 0.10 x10*3/uL   Magnesium   Result Value Ref Range    Magnesium 2.00 1.60 - 2.40 mg/dL   Comprehensive metabolic panel   Result Value Ref Range    Glucose 131 (H) 74 - 99 mg/dL    Sodium 133 (L) 136 - 145 mmol/L    Potassium 3.7 3.5 - 5.3 mmol/L    Chloride 98 98 - 107 mmol/L    Bicarbonate 22 21 - 32 mmol/L    Anion Gap 17 10 - 20 mmol/L    Urea Nitrogen 16 6 - 23 mg/dL    Creatinine 0.77 0.50 - 1.05 mg/dL    eGFR 85 >60 mL/min/1.73m*2    Calcium 8.7 8.6 - 10.3 mg/dL    Albumin 3.8 3.4 - 5.0 g/dL    Alkaline Phosphatase 78 33 - 136 U/L    Total Protein 7.1 6.4 - 8.2 g/dL    AST 12 9 - 39 U/L    Bilirubin, Total 0.9 0.0 - 1.2 mg/dL    ALT 9 7 - 45 U/L   Lactate   Result Value Ref Range    Lactate 1.1 0.4 - 2.0 mmol/L   Lipase   Result Value Ref Range    Lipase 9 9 - 82 U/L   TSH with reflex to Free T4 if abnormal   Result Value Ref Range    Thyroid Stimulating Hormone 4.96 (H) 0.44 - 3.98 mIU/L   Troponin I, High Sensitivity   Result Value Ref Range    Troponin I, High Sensitivity 12 0 - 13 ng/L   Thyroxine, Free   Result Value Ref Range    Thyroxine, Free 0.96 0.61 - 1.12 ng/dL   SARS-CoV-2 RT PCR   Result Value Ref Range    Coronavirus 2019, PCR Not Detected Not Detected   Influenza A, and B PCR   Result Value Ref Range    Flu A Result Not Detected Not Detected    Flu B Result Not Detected Not Detected   Urinalysis with Reflex Microscopic and Culture   Result Value Ref Range    Color, Urine Idania (N) Straw,  Yellow    Appearance, Urine Cloudy (N) Clear    Specific Gravity, Urine 1.016 1.005 - 1.035    pH, Urine 5.0 5.0, 5.5, 6.0, 6.5, 7.0, 7.5, 8.0    Protein, Urine 100 (2+) (N) NEGATIVE mg/dL    Glucose, Urine NEGATIVE NEGATIVE mg/dL    Blood, Urine SMALL (1+) (A) NEGATIVE    Ketones, Urine 20 (1+) (A) NEGATIVE mg/dL    Bilirubin, Urine NEGATIVE NEGATIVE    Urobilinogen, Urine 4.0 (N) <2.0 mg/dL    Nitrite, Urine NEGATIVE NEGATIVE    Leukocyte Esterase, Urine LARGE (3+) (A) NEGATIVE   Microscopic Only, Urine   Result Value Ref Range    WBC, Urine >50 (A) 1-5, NONE /HPF    WBC Clumps, Urine MANY Reference range not established. /HPF    RBC, Urine 6-10 (A) NONE, 1-2, 3-5 /HPF    Squamous Epithelial Cells, Urine 1-9 (SPARSE) Reference range not established. /HPF    Bacteria, Urine 4+ (A) NONE SEEN /HPF    Mucus, Urine 3+ Reference range not established. /LPF     CT abdomen pelvis w IV contrast    Result Date: 11/19/2023  Interpreted By:  Lis Jackson, STUDY: CT ABDOMEN PELVIS W IV CONTRAST;  11/19/2023 7:09 pm   INDICATION: Signs/Symptoms:concern for pyelo.   COMPARISON: CT scan of the abdomen pelvis 06/11/2020   ACCESSION NUMBER(S): [id number]   ORDERING CLINICIAN: VEE WEBBER   TECHNIQUE: Axial CT images of the abdomen and pelvis with coronal and sagittal reconstructed images obtained after intravenous administration of 75 mL of Omnipaque 350.   FINDINGS: LOWER CHEST: Bibasilar atelectasis and or scarring. Large hiatal hernia with significant portion of the stomach intrathoracic in location.   ABDOMEN:   LIVER: Within normal limits. BILE DUCTS: Normal caliber. GALLBLADDER: Cholelithiasis. PANCREAS: Within normal limits. SPLEEN: Within normal limits. ADRENALS: Within normal limits. KIDNEYS and URETERS: Bilateral kidneys demonstrate wedge-shaped areas of hypoenhancement with perinephric inflammatory stranding. Findings are concerning for acute pyelonephritis. No hydronephrosis or hydroureter. There is  a 2-3 mm nonobstructing calculus in the interpolar region of the left kidney. A 4 mm nonobstructing calculus is noted in the left distal ureter. No evidence for right renal or ureteral calculi.   VESSELS:  Calcific atherosclerosis of the aortoiliac vessels. No aortic aneurysm. RETROPERITONEUM: No pathologically enlarged retroperitoneal lymph nodes.   PELVIS:   REPRODUCTIVE ORGANS: Uterus is present. No adnexal mass. BLADDER: Bladder is partially distended with mild wall thickening and several locules of air. Subtle perivesical stranding   BOWEL: Large hiatal hernia. Visualized loops of bowel are without evidence for obstruction. Moderate stool burden. Scattered colonic diverticula. No evidence for acute diverticulitis. No pneumatosis or portal venous gas. Normal appendix. PERITONEUM: No ascites or free air, no fluid collection.   ABDOMINAL WALL: Small umbilical hernia contains fat. Patulous inguinal canals containing fat. BONES: Multilevel degenerative changes of the spine. Grade 1 anterolisthesis of L4 on 5.       There are multiple peripheral wedge-shaped areas of hypoenhancement bilateral kidneys with perinephric inflammatory stranding. Mild urothelial thickening. Findings concerning for acute pyelonephritis.   There is a 3-4 mm nonobstructing calculus in the left distal ureter. Additional punctate 2-3 mm calculus is noted in the interpolar region of the left kidney.   Bladder is partially distended with mild wall thickening and subtle perivesical stranding. Subtle tiny locules of air are noted in the bladder. Correlate for history of any recent instrumentation. In the absence of such history findings are concerning for infectious cystitis. Correlate with urinalysis.   Diverticulosis without evidence for acute diverticulitis.   Cholelithiasis.   Additional findings as described above.   MACRO: None   Signed by: Lis Jackson 11/19/2023 7:39 PM Dictation workstation:   [id number]    XR chest 2 views    Result  Date: 11/19/2023  STUDY: Chest Radiographs;  11/19/2023, 3:18PM. INDICATION: Shortness of breath. COMPARISON: CXR 6/11/2020. ACCESSION NUMBER(S): [id number] ORDERING CLINICIAN: VEE WEBBER TECHNIQUE:  Frontal and lateral chest. FINDINGS: CARDIOMEDIASTINAL SILHOUETTE: Cardiomediastinal silhouette is normal in size and configuration.  LUNGS: Lungs are clear.  ABDOMEN: No remarkable upper abdominal findings.  BONES: No acute osseous changes.    No acute pulmonary pathology. Signed by Blaine Garcias MD        Assessment/Plan   Active Problems:    Pyelonephritis    Nephrolithiasis  - Ceftriaxone  - IVF  - pain control  - Urology consult for nephrolithiasis  - flomax for stone    DM2  - off meds for months at least  - Insulin sliding scale  - check HbA1c    Hypothyroidism  - TSH elevated  - Free T4 WNL  - restart synthroid 25 mcg daily    HLD  - resume statin    HTN  - hydralazine prn    GERD  - Pantoprazole    Code status  - FULL       I spent 65 minutes in the professional and overall care of this patient.      Luna Gallegos MD

## 2023-11-20 NOTE — PROGRESS NOTES
11/20/23 1249   Discharge Planning   Living Arrangements Spouse/significant other;Family members   Support Systems Spouse/significant other;Children   Assistance Needed IND AT HOME WITH    Type of Residence Private residence   Number of Stairs to Enter Residence 2   Number of Stairs Within Residence 0   Do you have animals or pets at home? No   Who is requesting discharge planning? Provider   Home or Post Acute Services None   Patient expects to be discharged to: HOME NO NEEDS WITH    Does the patient need discharge transport arranged? Yes   RoundTrip coordination needed? Yes   Has discharge transport been arranged? No   Financial Resource Strain   How hard is it for you to pay for the very basics like food, housing, medical care, and heating? Not hard   Housing Stability   In the last 12 months, was there a time when you were not able to pay the mortgage or rent on time? N   In the last 12 months, was there a time when you did not have a steady place to sleep or slept in a shelter (including now)? N   Transportation Needs   In the past 12 months, has lack of transportation kept you from medical appointments or from getting medications? no   In the past 12 months, has lack of transportation kept you from meetings, work, or from getting things needed for daily living? No     Care Coordinator Note:  TCC spoke with patient regarding dc planning. Demo is correct. Patient lives home with  and two grandchildren 14 months and 3 months. Patient is independent. Denies falls and denies DME. PCP jeremias Ahmadi at [facility] seen in 2023. Pharmacy is [facility].     Plan: patient in with N/V, CT showed non obstucting stone and pyelonephritis.   Urology consulted. IV atb and IVF ordered.   Status: inpatient  Payor: yaz heaton  Disposition:HNN with   Barrier:  ADOD: 1-2 days    Lulu Viveros [facility]

## 2023-11-21 ENCOUNTER — APPOINTMENT (OUTPATIENT)
Dept: RADIOLOGY | Facility: HOSPITAL | Age: 68
DRG: 660 | End: 2023-11-21
Payer: MEDICARE

## 2023-11-21 ENCOUNTER — PREP FOR PROCEDURE (OUTPATIENT)
Dept: PREOP | Facility: HOSPITAL | Age: 68
End: 2023-11-21
Payer: MEDICARE

## 2023-11-21 DIAGNOSIS — N20.1 LEFT URETERAL STONE: Primary | ICD-10-CM

## 2023-11-21 LAB
ANION GAP SERPL CALC-SCNC: 14 MMOL/L (ref 10–20)
BUN SERPL-MCNC: 11 MG/DL (ref 6–23)
CALCIUM SERPL-MCNC: 7.5 MG/DL (ref 8.6–10.3)
CHLORIDE SERPL-SCNC: 110 MMOL/L (ref 98–107)
CO2 SERPL-SCNC: 18 MMOL/L (ref 21–32)
CREAT SERPL-MCNC: 0.55 MG/DL (ref 0.5–1.05)
CRP SERPL-MCNC: 20.82 MG/DL
ERYTHROCYTE [DISTWIDTH] IN BLOOD BY AUTOMATED COUNT: 15.1 % (ref 11.5–14.5)
GFR SERPL CREATININE-BSD FRML MDRD: >90 ML/MIN/1.73M*2
GLUCOSE BLD MANUAL STRIP-MCNC: 217 MG/DL (ref 74–99)
GLUCOSE BLD MANUAL STRIP-MCNC: 82 MG/DL (ref 74–99)
GLUCOSE BLD MANUAL STRIP-MCNC: 97 MG/DL (ref 74–99)
GLUCOSE SERPL-MCNC: 106 MG/DL (ref 74–99)
HCT VFR BLD AUTO: 36.9 % (ref 36–46)
HGB BLD-MCNC: 11.4 G/DL (ref 12–16)
INR PPP: 1.2 (ref 0.9–1.1)
MAGNESIUM SERPL-MCNC: 2.1 MG/DL (ref 1.6–2.4)
MCH RBC QN AUTO: 26 PG (ref 26–34)
MCHC RBC AUTO-ENTMCNC: 30.9 G/DL (ref 32–36)
MCV RBC AUTO: 84 FL (ref 80–100)
NRBC BLD-RTO: 0 /100 WBCS (ref 0–0)
PLATELET # BLD AUTO: 255 X10*3/UL (ref 150–450)
POTASSIUM SERPL-SCNC: 3.5 MMOL/L (ref 3.5–5.3)
PROTHROMBIN TIME: 13.4 SECONDS (ref 9.8–12.8)
RBC # BLD AUTO: 4.38 X10*6/UL (ref 4–5.2)
SODIUM SERPL-SCNC: 138 MMOL/L (ref 136–145)
WBC # BLD AUTO: 6.8 X10*3/UL (ref 4.4–11.3)

## 2023-11-21 PROCEDURE — 83735 ASSAY OF MAGNESIUM: CPT | Performed by: INTERNAL MEDICINE

## 2023-11-21 PROCEDURE — 74176 CT ABD & PELVIS W/O CONTRAST: CPT | Performed by: RADIOLOGY

## 2023-11-21 PROCEDURE — 2500000001 HC RX 250 WO HCPCS SELF ADMINISTERED DRUGS (ALT 637 FOR MEDICARE OP): Performed by: HOSPITALIST

## 2023-11-21 PROCEDURE — 1100000001 HC PRIVATE ROOM DAILY

## 2023-11-21 PROCEDURE — 99232 SBSQ HOSP IP/OBS MODERATE 35: CPT | Performed by: INTERNAL MEDICINE

## 2023-11-21 PROCEDURE — 36415 COLL VENOUS BLD VENIPUNCTURE: CPT | Performed by: INTERNAL MEDICINE

## 2023-11-21 PROCEDURE — 85610 PROTHROMBIN TIME: CPT | Performed by: INTERNAL MEDICINE

## 2023-11-21 PROCEDURE — 74176 CT ABD & PELVIS W/O CONTRAST: CPT

## 2023-11-21 PROCEDURE — 94760 N-INVAS EAR/PLS OXIMETRY 1: CPT

## 2023-11-21 PROCEDURE — 85027 COMPLETE CBC AUTOMATED: CPT

## 2023-11-21 PROCEDURE — 80048 BASIC METABOLIC PNL TOTAL CA: CPT | Performed by: INTERNAL MEDICINE

## 2023-11-21 PROCEDURE — 99231 SBSQ HOSP IP/OBS SF/LOW 25: CPT | Performed by: REGISTERED NURSE

## 2023-11-21 PROCEDURE — 2500000004 HC RX 250 GENERAL PHARMACY W/ HCPCS (ALT 636 FOR OP/ED): Performed by: HOSPITALIST

## 2023-11-21 PROCEDURE — 2500000004 HC RX 250 GENERAL PHARMACY W/ HCPCS (ALT 636 FOR OP/ED): Performed by: INTERNAL MEDICINE

## 2023-11-21 PROCEDURE — 86140 C-REACTIVE PROTEIN: CPT | Performed by: INTERNAL MEDICINE

## 2023-11-21 PROCEDURE — 82947 ASSAY GLUCOSE BLOOD QUANT: CPT

## 2023-11-21 PROCEDURE — 36415 COLL VENOUS BLD VENIPUNCTURE: CPT

## 2023-11-21 PROCEDURE — C9113 INJ PANTOPRAZOLE SODIUM, VIA: HCPCS | Performed by: HOSPITALIST

## 2023-11-21 RX ORDER — SODIUM CHLORIDE 450 MG/100ML
100 INJECTION, SOLUTION INTRAVENOUS CONTINUOUS
Status: DISCONTINUED | OUTPATIENT
Start: 2023-11-21 | End: 2023-11-21

## 2023-11-21 RX ORDER — SODIUM CHLORIDE 9 MG/ML
100 INJECTION, SOLUTION INTRAVENOUS CONTINUOUS
Status: CANCELLED | OUTPATIENT
Start: 2023-11-21

## 2023-11-21 RX ORDER — POTASSIUM CHLORIDE 14.9 MG/ML
20 INJECTION INTRAVENOUS ONCE
Status: COMPLETED | OUTPATIENT
Start: 2023-11-21 | End: 2023-11-21

## 2023-11-21 RX ADMIN — POLYETHYLENE GLYCOL 3350 17 G: 17 POWDER, FOR SOLUTION ORAL at 08:49

## 2023-11-21 RX ADMIN — TAMSULOSIN HYDROCHLORIDE 0.4 MG: 0.4 CAPSULE ORAL at 08:49

## 2023-11-21 RX ADMIN — PANTOPRAZOLE SODIUM 40 MG: 40 INJECTION, POWDER, FOR SOLUTION INTRAVENOUS at 08:48

## 2023-11-21 RX ADMIN — LEVOTHYROXINE SODIUM 25 MCG: 0.03 TABLET ORAL at 06:06

## 2023-11-21 RX ADMIN — POTASSIUM CHLORIDE 20 MEQ: 14.9 INJECTION, SOLUTION INTRAVENOUS at 08:49

## 2023-11-21 RX ADMIN — ACETAMINOPHEN 650 MG: 325 TABLET ORAL at 11:57

## 2023-11-21 RX ADMIN — SODIUM CHLORIDE 100 ML/HR: 4.5 INJECTION, SOLUTION INTRAVENOUS at 08:49

## 2023-11-21 RX ADMIN — ACETAMINOPHEN 650 MG: 325 TABLET ORAL at 06:07

## 2023-11-21 RX ADMIN — Medication 3 MG: at 20:26

## 2023-11-21 RX ADMIN — CEFTRIAXONE 2 G: 2 INJECTION, POWDER, FOR SOLUTION INTRAMUSCULAR; INTRAVENOUS at 11:57

## 2023-11-21 ASSESSMENT — COGNITIVE AND FUNCTIONAL STATUS - GENERAL
DAILY ACTIVITIY SCORE: 24
CLIMB 3 TO 5 STEPS WITH RAILING: A LOT
MOBILITY SCORE: 22
MOBILITY SCORE: 24
DAILY ACTIVITIY SCORE: 24

## 2023-11-21 ASSESSMENT — PAIN SCALES - GENERAL
PAINLEVEL_OUTOF10: 3
PAINLEVEL_OUTOF10: 0 - NO PAIN
PAINLEVEL_OUTOF10: 4
PAINLEVEL_OUTOF10: 4

## 2023-11-21 ASSESSMENT — PAIN - FUNCTIONAL ASSESSMENT
PAIN_FUNCTIONAL_ASSESSMENT: 0-10

## 2023-11-21 NOTE — PROGRESS NOTES
Tabatha Pascal is a 68 y.o. female on day 2 of admission presenting with Pyelonephritis.    Plan: Awaiting urology decision on potential stent placement  Disposition: Home with   Barrier: urology clearance  ADOD:  tomorrow    Gianna Martinez RN

## 2023-11-21 NOTE — PROGRESS NOTES
"Tabatha Pascal is a 68 y.o. female on day 2 of admission presenting with Pyelonephritis.    Assessment/Plan        Principal Problem:    Pyelonephritis  Active Problems:    Nephrolithiasis    Diabetes (CMS/HCC)    HTN (hypertension)    Hypothyroidism  - rocephin 2 g; follow up culture  - no stones are clearly obstructing, but will do flomax  - pain control  - urology consulted --> if doesn't pass stone today, will have cysto tomorrow to remove it           Subjective   She is feeling better today; pain is much improved.        Objective     Physical Exam  Cardiovascular:      Rate and Rhythm: Normal rate and regular rhythm.      Heart sounds: Normal heart sounds.   Pulmonary:      Breath sounds: Normal breath sounds.   Abdominal:      General: Bowel sounds are normal.      Palpations: Abdomen is soft.   Musculoskeletal:         General: Normal range of motion.   Neurological:      General: No focal deficit present.      Mental Status: She is alert and oriented to person, place, and time.   Psychiatric:         Mood and Affect: Mood normal.         Last Recorded Vitals  Blood pressure 142/77, pulse 66, temperature 36.6 °C (97.9 °F), resp. rate 16, height 1.676 m (5' 6\"), weight 74.8 kg (165 lb), SpO2 98 %.  Intake/Output last 3 Shifts:  I/O last 3 completed shifts:  In: 1260 (16.8 mL/kg) [P.O.:1260]  Out: - (0 mL/kg)   Weight: 74.8 kg                  I spent 40 minutes in the professional and overall care of this patient.      Domingo Fournier MD      "

## 2023-11-21 NOTE — PROGRESS NOTES
"Tabatha Pascal is a 68 y.o. female on day 2 of admission presenting with Pyelonephritis.    Subjective   NAEO. Pt awake, sitting at the edge of the bed, NAD. Patient having very minimal lower back pain. Denies any urinary symptoms, dysuria, incontinence, frequency. Patient denies any fevers, chills, SOB, N/V. Awaiting to be taken down for CT scan.        Objective     Physical Exam  Constitutional:       Appearance: Normal appearance.   HENT:      Mouth/Throat:      Mouth: Mucous membranes are moist.   Cardiovascular:      Rate and Rhythm: Normal rate and regular rhythm.      Pulses: Normal pulses.   Pulmonary:      Effort: Pulmonary effort is normal.      Breath sounds: Normal breath sounds.   Abdominal:      General: Bowel sounds are normal.      Palpations: Abdomen is soft.   Musculoskeletal:         General: Normal range of motion.      Comments: Generalized weakness   Skin:     Capillary Refill: Capillary refill takes less than 2 seconds.   Neurological:      Mental Status: She is alert and oriented to person, place, and time.         Last Recorded Vitals  Blood pressure 117/72, pulse 71, temperature 36.3 °C (97.4 °F), temperature source Temporal, resp. rate 16, height 1.676 m (5' 6\"), weight 74.8 kg (165 lb), SpO2 95 %.  Intake/Output last 3 Shifts:  I/O last 3 completed shifts:  In: 1260 (16.8 mL/kg) [P.O.:1260]  Out: - (0 mL/kg)   Weight: 74.8 kg     Relevant Results  Results for orders placed or performed during the hospital encounter of 11/19/23 (from the past 24 hour(s))   POCT GLUCOSE   Result Value Ref Range    POCT Glucose 135 (H) 74 - 99 mg/dL   Basic Metabolic Panel   Result Value Ref Range    Glucose 106 (H) 74 - 99 mg/dL    Sodium 138 136 - 145 mmol/L    Potassium 3.5 3.5 - 5.3 mmol/L    Chloride 110 (H) 98 - 107 mmol/L    Bicarbonate 18 (L) 21 - 32 mmol/L    Anion Gap 14 10 - 20 mmol/L    Urea Nitrogen 11 6 - 23 mg/dL    Creatinine 0.55 0.50 - 1.05 mg/dL    eGFR >90 >60 mL/min/1.73m*2    Calcium " 7.5 (L) 8.6 - 10.3 mg/dL   Protime-INR   Result Value Ref Range    Protime 13.4 (H) 9.8 - 12.8 seconds    INR 1.2 (H) 0.9 - 1.1   CBC   Result Value Ref Range    WBC 6.8 4.4 - 11.3 x10*3/uL    nRBC 0.0 0.0 - 0.0 /100 WBCs    RBC 4.38 4.00 - 5.20 x10*6/uL    Hemoglobin 11.4 (L) 12.0 - 16.0 g/dL    Hematocrit 36.9 36.0 - 46.0 %    MCV 84 80 - 100 fL    MCH 26.0 26.0 - 34.0 pg    MCHC 30.9 (L) 32.0 - 36.0 g/dL    RDW 15.1 (H) 11.5 - 14.5 %    Platelets 255 150 - 450 x10*3/uL   C-reactive protein   Result Value Ref Range    C-Reactive Protein 20.82 (H) <1.00 mg/dL   POCT GLUCOSE   Result Value Ref Range    POCT Glucose 97 74 - 99 mg/dL     Scheduled medications  cefTRIAXone, 2 g, intravenous, q24h  levothyroxine, 25 mcg, oral, Daily  melatonin, 3 mg, oral, Daily  pantoprazole, 40 mg, intravenous, Daily  polyethylene glycol, 17 g, oral, Daily  potassium chloride, 20 mEq, intravenous, Once  tamsulosin, 0.4 mg, oral, Daily      Continuous medications  sodium chloride, 100 mL/hr, Last Rate: 100 mL/hr (11/21/23 0849)      PRN medications  PRN medications: acetaminophen, acetaminophen, morphine, morphine, ondansetron, ondansetron ODT **OR** ondansetron  CT abdomen pelvis w IV contrast    Result Date: 11/19/2023  Interpreted By:  Lis Jackson, STUDY: CT ABDOMEN PELVIS W IV CONTRAST;  11/19/2023 7:09 pm   INDICATION: Signs/Symptoms:concern for pyelo.   COMPARISON: CT scan of the abdomen pelvis 06/11/2020   ACCESSION NUMBER(S): [id number]   ORDERING CLINICIAN: VEE WEBBER   TECHNIQUE: Axial CT images of the abdomen and pelvis with coronal and sagittal reconstructed images obtained after intravenous administration of 75 mL of Omnipaque 350.   FINDINGS: LOWER CHEST: Bibasilar atelectasis and or scarring. Large hiatal hernia with significant portion of the stomach intrathoracic in location.   ABDOMEN:   LIVER: Within normal limits. BILE DUCTS: Normal caliber. GALLBLADDER: Cholelithiasis. PANCREAS: Within normal  limits. SPLEEN: Within normal limits. ADRENALS: Within normal limits. KIDNEYS and URETERS: Bilateral kidneys demonstrate wedge-shaped areas of hypoenhancement with perinephric inflammatory stranding. Findings are concerning for acute pyelonephritis. No hydronephrosis or hydroureter. There is a 2-3 mm nonobstructing calculus in the interpolar region of the left kidney. A 4 mm nonobstructing calculus is noted in the left distal ureter. No evidence for right renal or ureteral calculi.   VESSELS:  Calcific atherosclerosis of the aortoiliac vessels. No aortic aneurysm. RETROPERITONEUM: No pathologically enlarged retroperitoneal lymph nodes.   PELVIS:   REPRODUCTIVE ORGANS: Uterus is present. No adnexal mass. BLADDER: Bladder is partially distended with mild wall thickening and several locules of air. Subtle perivesical stranding   BOWEL: Large hiatal hernia. Visualized loops of bowel are without evidence for obstruction. Moderate stool burden. Scattered colonic diverticula. No evidence for acute diverticulitis. No pneumatosis or portal venous gas. Normal appendix. PERITONEUM: No ascites or free air, no fluid collection.   ABDOMINAL WALL: Small umbilical hernia contains fat. Patulous inguinal canals containing fat. BONES: Multilevel degenerative changes of the spine. Grade 1 anterolisthesis of L4 on 5.       There are multiple peripheral wedge-shaped areas of hypoenhancement bilateral kidneys with perinephric inflammatory stranding. Mild urothelial thickening. Findings concerning for acute pyelonephritis.   There is a 3-4 mm nonobstructing calculus in the left distal ureter. Additional punctate 2-3 mm calculus is noted in the interpolar region of the left kidney.   Bladder is partially distended with mild wall thickening and subtle perivesical stranding. Subtle tiny locules of air are noted in the bladder. Correlate for history of any recent instrumentation. In the absence of such history findings are concerning for  infectious cystitis. Correlate with urinalysis.   Diverticulosis without evidence for acute diverticulitis.   Cholelithiasis.   Additional findings as described above.   MACRO: None   Signed by: Lis Jackson 11/19/2023 7:39 PM Dictation workstation:   [id number]    XR chest 2 views    Result Date: 11/19/2023  STUDY: Chest Radiographs;  11/19/2023, 3:18PM. INDICATION: Shortness of breath. COMPARISON: CXR 6/11/2020. ACCESSION NUMBER(S): [id number] ORDERING CLINICIAN: VEE WEBBER TECHNIQUE:  Frontal and lateral chest. FINDINGS: CARDIOMEDIASTINAL SILHOUETTE: Cardiomediastinal silhouette is normal in size and configuration.  LUNGS: Lungs are clear.  ABDOMEN: No remarkable upper abdominal findings.  BONES: No acute osseous changes.    No acute pulmonary pathology. Signed by Blaine Garcias MD       Assessment/Plan   Principal Problem:    Pyelonephritis  Active Problems:    Nephrolithiasis    Diabetes (CMS/HCC)    HTN (hypertension)    Hypothyroidism  68 YOF presenting with ml of not feeling well, fever, chills, urinary urgency. Patient is awake, alert, NAD, feeling better this morning.    Plan: Pyelonephritis/UTI/ non obstructing kidney stone  - CT scan today to eval stone> slight distal migration of 4mm distal left ureteral stone, now proximal to the UVJ. No hydronephrosis, now with large air-fluid level in the bladder  - NPO at [facility] for possible stone removal if patient does not pass on own  - Concern for emphysematous pyelo cystitis  - Continue antibiotics  - PRN pain control and antiemetics  - Afebrile, no leukocytosis, check AM labs  - Continue flomax, IVF    Dispo: CT scan today shows slight distal migration, but now with large air-fluid level in bladder. NPO at [facility] for possible removal of stone if patient not able to pass on her own. Urology will continue to follow. Discussed with Dr Jerica Abdi.       I spent 35 minutes in the professional and overall care of this patient.      Ella DOMINGUEZ  Yared, APRN-CNP

## 2023-11-21 NOTE — NURSING NOTE
Pt noted with no change in condition. Pt showing no s.s of distress or discomfort. Pt safety measures in place will endorse to oncoming nurse to continue to monitor.

## 2023-11-22 ENCOUNTER — APPOINTMENT (OUTPATIENT)
Dept: RADIOLOGY | Facility: HOSPITAL | Age: 68
DRG: 660 | End: 2023-11-22
Payer: MEDICARE

## 2023-11-22 ENCOUNTER — ANESTHESIA (OUTPATIENT)
Dept: OPERATING ROOM | Facility: HOSPITAL | Age: 68
DRG: 660 | End: 2023-11-22
Payer: MEDICARE

## 2023-11-22 ENCOUNTER — ANESTHESIA EVENT (OUTPATIENT)
Dept: OPERATING ROOM | Facility: HOSPITAL | Age: 68
DRG: 660 | End: 2023-11-22
Payer: MEDICARE

## 2023-11-22 PROBLEM — R11.2 PONV (POSTOPERATIVE NAUSEA AND VOMITING): Status: ACTIVE | Noted: 2023-11-22

## 2023-11-22 PROBLEM — Z98.890 PONV (POSTOPERATIVE NAUSEA AND VOMITING): Status: ACTIVE | Noted: 2023-11-22

## 2023-11-22 LAB
ANION GAP SERPL CALC-SCNC: 14 MMOL/L (ref 10–20)
BACTERIA UR CULT: ABNORMAL
BUN SERPL-MCNC: 10 MG/DL (ref 6–23)
CALCIUM SERPL-MCNC: 7.2 MG/DL (ref 8.6–10.3)
CHLORIDE SERPL-SCNC: 107 MMOL/L (ref 98–107)
CO2 SERPL-SCNC: 22 MMOL/L (ref 21–32)
CREAT SERPL-MCNC: 0.52 MG/DL (ref 0.5–1.05)
ERYTHROCYTE [DISTWIDTH] IN BLOOD BY AUTOMATED COUNT: 15.3 % (ref 11.5–14.5)
GFR SERPL CREATININE-BSD FRML MDRD: >90 ML/MIN/1.73M*2
GLUCOSE BLD MANUAL STRIP-MCNC: 106 MG/DL (ref 74–99)
GLUCOSE BLD MANUAL STRIP-MCNC: 106 MG/DL (ref 74–99)
GLUCOSE BLD MANUAL STRIP-MCNC: 142 MG/DL (ref 74–99)
GLUCOSE BLD MANUAL STRIP-MCNC: 197 MG/DL (ref 74–99)
GLUCOSE SERPL-MCNC: 117 MG/DL (ref 74–99)
HCT VFR BLD AUTO: 35 % (ref 36–46)
HGB BLD-MCNC: 10.8 G/DL (ref 12–16)
MCH RBC QN AUTO: 25.5 PG (ref 26–34)
MCHC RBC AUTO-ENTMCNC: 30.9 G/DL (ref 32–36)
MCV RBC AUTO: 83 FL (ref 80–100)
NRBC BLD-RTO: 0 /100 WBCS (ref 0–0)
PLATELET # BLD AUTO: 327 X10*3/UL (ref 150–450)
POTASSIUM SERPL-SCNC: 4 MMOL/L (ref 3.5–5.3)
RBC # BLD AUTO: 4.24 X10*6/UL (ref 4–5.2)
SODIUM SERPL-SCNC: 139 MMOL/L (ref 136–145)
WBC # BLD AUTO: 6.2 X10*3/UL (ref 4.4–11.3)

## 2023-11-22 PROCEDURE — 2500000005 HC RX 250 GENERAL PHARMACY W/O HCPCS: Performed by: ANESTHESIOLOGIST ASSISTANT

## 2023-11-22 PROCEDURE — 2780000003 HC OR 278 NO HCPCS: Performed by: STUDENT IN AN ORGANIZED HEALTH CARE EDUCATION/TRAINING PROGRAM

## 2023-11-22 PROCEDURE — 85027 COMPLETE CBC AUTOMATED: CPT

## 2023-11-22 PROCEDURE — C1894 INTRO/SHEATH, NON-LASER: HCPCS | Performed by: STUDENT IN AN ORGANIZED HEALTH CARE EDUCATION/TRAINING PROGRAM

## 2023-11-22 PROCEDURE — C1769 GUIDE WIRE: HCPCS | Performed by: STUDENT IN AN ORGANIZED HEALTH CARE EDUCATION/TRAINING PROGRAM

## 2023-11-22 PROCEDURE — 7100000001 HC RECOVERY ROOM TIME - INITIAL BASE CHARGE: Performed by: STUDENT IN AN ORGANIZED HEALTH CARE EDUCATION/TRAINING PROGRAM

## 2023-11-22 PROCEDURE — C9113 INJ PANTOPRAZOLE SODIUM, VIA: HCPCS | Performed by: HOSPITALIST

## 2023-11-22 PROCEDURE — 2500000004 HC RX 250 GENERAL PHARMACY W/ HCPCS (ALT 636 FOR OP/ED): Performed by: INTERNAL MEDICINE

## 2023-11-22 PROCEDURE — A4217 STERILE WATER/SALINE, 500 ML: HCPCS | Performed by: STUDENT IN AN ORGANIZED HEALTH CARE EDUCATION/TRAINING PROGRAM

## 2023-11-22 PROCEDURE — 3700000001 HC GENERAL ANESTHESIA TIME - INITIAL BASE CHARGE: Performed by: STUDENT IN AN ORGANIZED HEALTH CARE EDUCATION/TRAINING PROGRAM

## 2023-11-22 PROCEDURE — 2720000007 HC OR 272 NO HCPCS: Performed by: STUDENT IN AN ORGANIZED HEALTH CARE EDUCATION/TRAINING PROGRAM

## 2023-11-22 PROCEDURE — 76000 FLUOROSCOPY <1 HR PHYS/QHP: CPT | Mod: LT

## 2023-11-22 PROCEDURE — C1758 CATHETER, URETERAL: HCPCS | Performed by: STUDENT IN AN ORGANIZED HEALTH CARE EDUCATION/TRAINING PROGRAM

## 2023-11-22 PROCEDURE — 82947 ASSAY GLUCOSE BLOOD QUANT: CPT

## 2023-11-22 PROCEDURE — 0TC78ZZ EXTIRPATION OF MATTER FROM LEFT URETER, VIA NATURAL OR ARTIFICIAL OPENING ENDOSCOPIC: ICD-10-PCS | Performed by: STUDENT IN AN ORGANIZED HEALTH CARE EDUCATION/TRAINING PROGRAM

## 2023-11-22 PROCEDURE — 52356 CYSTO/URETERO W/LITHOTRIPSY: CPT | Performed by: STUDENT IN AN ORGANIZED HEALTH CARE EDUCATION/TRAINING PROGRAM

## 2023-11-22 PROCEDURE — 2550000001 HC RX 255 CONTRASTS: Performed by: STUDENT IN AN ORGANIZED HEALTH CARE EDUCATION/TRAINING PROGRAM

## 2023-11-22 PROCEDURE — A52332 PR CYSTOSCOPY,INSERT URETERAL STENT: Performed by: ANESTHESIOLOGY

## 2023-11-22 PROCEDURE — 3700000002 HC GENERAL ANESTHESIA TIME - EACH INCREMENTAL 1 MINUTE: Performed by: STUDENT IN AN ORGANIZED HEALTH CARE EDUCATION/TRAINING PROGRAM

## 2023-11-22 PROCEDURE — 2500000001 HC RX 250 WO HCPCS SELF ADMINISTERED DRUGS (ALT 637 FOR MEDICARE OP): Performed by: HOSPITALIST

## 2023-11-22 PROCEDURE — 2500000004 HC RX 250 GENERAL PHARMACY W/ HCPCS (ALT 636 FOR OP/ED): Performed by: HOSPITALIST

## 2023-11-22 PROCEDURE — 94760 N-INVAS EAR/PLS OXIMETRY 1: CPT

## 2023-11-22 PROCEDURE — 7100000002 HC RECOVERY ROOM TIME - EACH INCREMENTAL 1 MINUTE: Performed by: STUDENT IN AN ORGANIZED HEALTH CARE EDUCATION/TRAINING PROGRAM

## 2023-11-22 PROCEDURE — 36415 COLL VENOUS BLD VENIPUNCTURE: CPT

## 2023-11-22 PROCEDURE — C2617 STENT, NON-COR, TEM W/O DEL: HCPCS | Performed by: STUDENT IN AN ORGANIZED HEALTH CARE EDUCATION/TRAINING PROGRAM

## 2023-11-22 PROCEDURE — 82365 CALCULUS SPECTROSCOPY: CPT | Performed by: STUDENT IN AN ORGANIZED HEALTH CARE EDUCATION/TRAINING PROGRAM

## 2023-11-22 PROCEDURE — 2500000004 HC RX 250 GENERAL PHARMACY W/ HCPCS (ALT 636 FOR OP/ED): Performed by: ANESTHESIOLOGIST ASSISTANT

## 2023-11-22 PROCEDURE — 3600000003 HC OR TIME - INITIAL BASE CHARGE - PROCEDURE LEVEL THREE: Performed by: STUDENT IN AN ORGANIZED HEALTH CARE EDUCATION/TRAINING PROGRAM

## 2023-11-22 PROCEDURE — 0T9B8ZZ DRAINAGE OF BLADDER, VIA NATURAL OR ARTIFICIAL OPENING ENDOSCOPIC: ICD-10-PCS | Performed by: STUDENT IN AN ORGANIZED HEALTH CARE EDUCATION/TRAINING PROGRAM

## 2023-11-22 PROCEDURE — 36415 COLL VENOUS BLD VENIPUNCTURE: CPT | Performed by: INTERNAL MEDICINE

## 2023-11-22 PROCEDURE — 99232 SBSQ HOSP IP/OBS MODERATE 35: CPT | Performed by: INTERNAL MEDICINE

## 2023-11-22 PROCEDURE — 80048 BASIC METABOLIC PNL TOTAL CA: CPT | Performed by: INTERNAL MEDICINE

## 2023-11-22 PROCEDURE — 74420 UROGRAPHY RTRGR +-KUB: CPT | Performed by: STUDENT IN AN ORGANIZED HEALTH CARE EDUCATION/TRAINING PROGRAM

## 2023-11-22 PROCEDURE — 96372 THER/PROPH/DIAG INJ SC/IM: CPT | Performed by: INTERNAL MEDICINE

## 2023-11-22 PROCEDURE — 2500000004 HC RX 250 GENERAL PHARMACY W/ HCPCS (ALT 636 FOR OP/ED): Performed by: STUDENT IN AN ORGANIZED HEALTH CARE EDUCATION/TRAINING PROGRAM

## 2023-11-22 PROCEDURE — A52332 PR CYSTOSCOPY,INSERT URETERAL STENT: Performed by: ANESTHESIOLOGIST ASSISTANT

## 2023-11-22 PROCEDURE — 0T778DZ DILATION OF LEFT URETER WITH INTRALUMINAL DEVICE, VIA NATURAL OR ARTIFICIAL OPENING ENDOSCOPIC: ICD-10-PCS | Performed by: STUDENT IN AN ORGANIZED HEALTH CARE EDUCATION/TRAINING PROGRAM

## 2023-11-22 PROCEDURE — 3600000008 HC OR TIME - EACH INCREMENTAL 1 MINUTE - PROCEDURE LEVEL THREE: Performed by: STUDENT IN AN ORGANIZED HEALTH CARE EDUCATION/TRAINING PROGRAM

## 2023-11-22 PROCEDURE — 2500000001 HC RX 250 WO HCPCS SELF ADMINISTERED DRUGS (ALT 637 FOR MEDICARE OP): Performed by: STUDENT IN AN ORGANIZED HEALTH CARE EDUCATION/TRAINING PROGRAM

## 2023-11-22 PROCEDURE — 1100000001 HC PRIVATE ROOM DAILY

## 2023-11-22 PROCEDURE — BT1F1ZZ FLUOROSCOPY OF LEFT KIDNEY, URETER AND BLADDER USING LOW OSMOLAR CONTRAST: ICD-10-PCS | Performed by: STUDENT IN AN ORGANIZED HEALTH CARE EDUCATION/TRAINING PROGRAM

## 2023-11-22 DEVICE — STENT, TRIA URETHERAL, SOFT, 6 X  26: Type: IMPLANTABLE DEVICE | Site: URETER | Status: FUNCTIONAL

## 2023-11-22 RX ORDER — LIDOCAINE HYDROCHLORIDE 20 MG/ML
JELLY TOPICAL AS NEEDED
Status: DISCONTINUED | OUTPATIENT
Start: 2023-11-22 | End: 2023-11-22 | Stop reason: HOSPADM

## 2023-11-22 RX ORDER — FENTANYL CITRATE 50 UG/ML
INJECTION, SOLUTION INTRAMUSCULAR; INTRAVENOUS AS NEEDED
Status: DISCONTINUED | OUTPATIENT
Start: 2023-11-22 | End: 2023-11-22

## 2023-11-22 RX ORDER — PROPOFOL 10 MG/ML
INJECTION, EMULSION INTRAVENOUS AS NEEDED
Status: DISCONTINUED | OUTPATIENT
Start: 2023-11-22 | End: 2023-11-22

## 2023-11-22 RX ORDER — ALBUTEROL SULFATE 0.83 MG/ML
2.5 SOLUTION RESPIRATORY (INHALATION) ONCE AS NEEDED
Status: DISCONTINUED | OUTPATIENT
Start: 2023-11-22 | End: 2023-11-22 | Stop reason: HOSPADM

## 2023-11-22 RX ORDER — ONDANSETRON HYDROCHLORIDE 2 MG/ML
4 INJECTION, SOLUTION INTRAVENOUS ONCE AS NEEDED
Status: DISCONTINUED | OUTPATIENT
Start: 2023-11-22 | End: 2023-11-22 | Stop reason: HOSPADM

## 2023-11-22 RX ORDER — SODIUM CHLORIDE, SODIUM LACTATE, POTASSIUM CHLORIDE, CALCIUM CHLORIDE 600; 310; 30; 20 MG/100ML; MG/100ML; MG/100ML; MG/100ML
100 INJECTION, SOLUTION INTRAVENOUS CONTINUOUS
Status: DISCONTINUED | OUTPATIENT
Start: 2023-11-22 | End: 2023-11-22 | Stop reason: HOSPADM

## 2023-11-22 RX ORDER — OXYCODONE HYDROCHLORIDE 5 MG/1
5 TABLET ORAL EVERY 4 HOURS PRN
Status: DISCONTINUED | OUTPATIENT
Start: 2023-11-22 | End: 2023-11-22 | Stop reason: HOSPADM

## 2023-11-22 RX ORDER — ENOXAPARIN SODIUM 100 MG/ML
40 INJECTION SUBCUTANEOUS DAILY
Status: DISCONTINUED | OUTPATIENT
Start: 2023-11-22 | End: 2023-11-23 | Stop reason: HOSPADM

## 2023-11-22 RX ORDER — SODIUM CHLORIDE 0.9 G/100ML
IRRIGANT IRRIGATION AS NEEDED
Status: DISCONTINUED | OUTPATIENT
Start: 2023-11-22 | End: 2023-11-22 | Stop reason: HOSPADM

## 2023-11-22 RX ORDER — MIDAZOLAM HYDROCHLORIDE 1 MG/ML
INJECTION INTRAMUSCULAR; INTRAVENOUS AS NEEDED
Status: DISCONTINUED | OUTPATIENT
Start: 2023-11-22 | End: 2023-11-22

## 2023-11-22 RX ORDER — APREPITANT 40 MG/1
CAPSULE ORAL
Status: DISPENSED
Start: 2023-11-22 | End: 2023-11-22

## 2023-11-22 RX ORDER — LIDOCAINE HYDROCHLORIDE 20 MG/ML
INJECTION, SOLUTION EPIDURAL; INFILTRATION; INTRACAUDAL; PERINEURAL AS NEEDED
Status: DISCONTINUED | OUTPATIENT
Start: 2023-11-22 | End: 2023-11-22

## 2023-11-22 RX ORDER — PHENYLEPHRINE HCL IN 0.9% NACL 1 MG/10 ML
SYRINGE (ML) INTRAVENOUS AS NEEDED
Status: DISCONTINUED | OUTPATIENT
Start: 2023-11-22 | End: 2023-11-22

## 2023-11-22 RX ADMIN — ONDANSETRON 4 MG: 2 INJECTION INTRAMUSCULAR; INTRAVENOUS at 13:24

## 2023-11-22 RX ADMIN — CEFTRIAXONE 2 G: 2 INJECTION, POWDER, FOR SOLUTION INTRAMUSCULAR; INTRAVENOUS at 10:39

## 2023-11-22 RX ADMIN — Medication 100 MCG: at 13:13

## 2023-11-22 RX ADMIN — ACETAMINOPHEN 650 MG: 325 TABLET ORAL at 17:36

## 2023-11-22 RX ADMIN — Medication 100 MCG: at 13:08

## 2023-11-22 RX ADMIN — FENTANYL CITRATE 50 MCG: 50 INJECTION, SOLUTION INTRAMUSCULAR; INTRAVENOUS at 12:51

## 2023-11-22 RX ADMIN — TAMSULOSIN HYDROCHLORIDE 0.4 MG: 0.4 CAPSULE ORAL at 08:54

## 2023-11-22 RX ADMIN — FENTANYL CITRATE 25 MCG: 50 INJECTION, SOLUTION INTRAMUSCULAR; INTRAVENOUS at 13:28

## 2023-11-22 RX ADMIN — SODIUM CHLORIDE, SODIUM LACTATE, POTASSIUM CHLORIDE, AND CALCIUM CHLORIDE: 600; 310; 30; 20 INJECTION, SOLUTION INTRAVENOUS at 12:37

## 2023-11-22 RX ADMIN — ENOXAPARIN SODIUM 40 MG: 40 INJECTION SUBCUTANEOUS at 20:33

## 2023-11-22 RX ADMIN — LIDOCAINE HYDROCHLORIDE 100 MG: 20 INJECTION, SOLUTION EPIDURAL; INFILTRATION; INTRACAUDAL; PERINEURAL at 12:51

## 2023-11-22 RX ADMIN — PROPOFOL 160 MG: 10 INJECTION, EMULSION INTRAVENOUS at 12:51

## 2023-11-22 RX ADMIN — ACETAMINOPHEN 650 MG: 325 TABLET ORAL at 08:54

## 2023-11-22 RX ADMIN — MIDAZOLAM HYDROCHLORIDE 2 MG: 1 INJECTION, SOLUTION INTRAMUSCULAR; INTRAVENOUS at 12:44

## 2023-11-22 RX ADMIN — PANTOPRAZOLE SODIUM 40 MG: 40 INJECTION, POWDER, FOR SOLUTION INTRAVENOUS at 08:54

## 2023-11-22 RX ADMIN — Medication 100 MCG: at 13:16

## 2023-11-22 RX ADMIN — PROPOFOL 40 MG: 10 INJECTION, EMULSION INTRAVENOUS at 12:55

## 2023-11-22 RX ADMIN — Medication 3 MG: at 20:32

## 2023-11-22 ASSESSMENT — PAIN SCALES - GENERAL
PAINLEVEL_OUTOF10: 3
PAINLEVEL_OUTOF10: 0 - NO PAIN
PAINLEVEL_OUTOF10: 3
PAINLEVEL_OUTOF10: 0 - NO PAIN
PAINLEVEL_OUTOF10: 3

## 2023-11-22 ASSESSMENT — COGNITIVE AND FUNCTIONAL STATUS - GENERAL
MOBILITY SCORE: 24
DAILY ACTIVITIY SCORE: 24
MOBILITY SCORE: 24
DAILY ACTIVITIY SCORE: 24

## 2023-11-22 ASSESSMENT — PAIN - FUNCTIONAL ASSESSMENT
PAIN_FUNCTIONAL_ASSESSMENT: 0-10

## 2023-11-22 NOTE — OP NOTE
Cystoscopy with Lithotripsy Laser, stent placement (L), Cystoscopy with Retrograde Pyelogram (L) Operative Note     Date: 2023 - 2023  OR Location: [facility] OR    Name: Tabatha Pascal : 1955, Age: 68 y.o., MRN: [id number], Sex: female    Diagnosis  Pre-op Diagnosis     * Left ureteral stone [N20.1] Post-op Diagnosis     * Left ureteral stone [N20.1]     Procedures  Cystoscopy with Lithotripsy Laser, stent placement  57301 - ND CYSTO/URETERO W/LITHOTRIPSY &INDWELL STENT INSRT    Cystoscopy with Retrograde Pyelogram  56184 - CHG UROGRAPHY RETROGRADE WITH/WO KUB      Surgeons      * Lamine Abdi - Primary    Resident/Fellow/Other Assistant:  Surgeon(s) and Role:    Procedure Summary  Anesthesia: General  ASA: III  Anesthesia Staff: Anesthesiologist: Hector Valentine MD  C-AA: HUSEYIN Ward  Estimated Blood Loss: 5mL  Intra-op Medications: * No intraprocedure medications in log *           Anesthesia Record               Intraprocedure I/O Totals          Intake    Propofol Drip 0.00 mL    The total shown is the total volume documented since Anesthesia Start was filed.    Total Intake 0 mL          Specimen:   ID Type Source Tests Collected by Time   A : left ureteral stone Calculus Ureter, Left CALCULI (STONE) ANALYSIS Lamine Abdi MD MPH 2023 1325        Staff:   Circulator: Chuy Fernández RN; Charlene Wall RN  Scrub Person: Teresita Markham         Drains and/or Catheters: * None in log *    Tourniquet Times:         Implants:  Implants       Type Name Action Serial No.       STENT, TRIA URETHERAL, SOFT, 6 X  26 - SNA - [id number] Implanted NA                  Indications: Tabatha Pascal is an 68 y.o. female who is having surgery for Left ureteral stone [N20.1].     The patient was seen in the preoperative area. The risks, benefits, complications, treatment options, non-operative alternatives, expected recovery and outcomes were discussed with the patient. The possibilities of  reaction to medication, pulmonary aspiration, injury to surrounding structures, bleeding, recurrent infection, the need for additional procedures, failure to diagnose a condition, and creating a complication requiring transfusion or operation were discussed with the patient. The patient concurred with the proposed plan, giving informed consent.  The site of surgery was properly noted/marked if necessary per policy. The patient has been actively warmed in preoperative area. Preoperative antibiotics have been ordered and given within 2 hours of incision. Venous thrombosis prophylaxis have been ordered including bilateral sequential compression devices    Procedure Details:     Patient consented to procedure in preoperative area. Risks and benefits discussed. Patient was marked on the left side. Allergies were reviewed and preoperative antibiotics were administered. Patient was brought to the operating room and placed in supine position on the operating room table. A timeout was performed. All were in agreement. Patient underwent general anesthesia without complication. They were repositioned in dorsal lithotomy and prepped and draped in the usual sterile fashion.     A 21 fr rigid cystoscope was used to perform cystourethroscopy. Urethra was normal. The bladder was filled with cloudy yellow urine which was drained and irrigated with sterile saline several times to improve visibility. UOs were in normal orthotopic position. No masses or stones were identified.  Through the left UO, a 5Fr open ended ureteral catheter was advanced ~2cm up the ureter. Retrograde pyelogram was performed.     Retrograde pyelogram interpretation: Ureter had normal caliber distally but had mid-distal ureteral filling defects with proximal hydroureterosis and tortuosity. A glidewire was advanced through the 5Fr catheter up to the lvel of the kidney as confirmed on fluoroscopy.    A rigid ureteroscope was advanced up the UO  under direct  visualization alongside the wire. There was mild ureteral narrowing just proximal to the level of the crossing iliac vessels. Just beyond this narrowing, there was a ~4mm x 5mm  spiculated yellow ureteral stone. The stone has created a very intense inflammatory reaction with secondary edema and stricture in the lumen. Pt is at very high risk for ureteral stricture.     A 365 micron laser fiber was advanced and the ureteral stone was fragmented to completion with no fragments remaining >1mm. A sample of stone fragments was extracted with a zero-tip basket and sent for analysis. Given the patient's cloudy urine on initial entry, pyeloscopy was not performed.    The rigid ureteroscope was withdrawn under direct visualization. No ureteral stones were noted on ureteroscopy. A 6x 26 JJ stent was placed over the safety wire with proximal curl noted in kidney on fluoro and distal curl in the bladder on direct visualization. Bladder was drained, instruments removed. This concluded the procedure. Patient was awoken from anesthesia without complication and transferred to PACU in stable condition.    She will fu with us in the office with a CT scan in 2 weeks.   Complications:  None; patient tolerated the procedure well.    Disposition: PACU - hemodynamically stable.  Condition: stable             Attending Attestation:     Lamine Abdi  Phone Number: [phone]

## 2023-11-22 NOTE — PROGRESS NOTES
"Tabatha Pascal is a 68 y.o. female on day 3 of admission presenting with Pyelonephritis.    Assessment/Plan        Principal Problem:    Pyelonephritis  Active Problems:    Nephrolithiasis    Diabetes (CMS/HCC)    HTN (hypertension)    Hypothyroidism    PONV (postoperative nausea and vomiting)    Left ureteral stone    11/22:  To the OR with  today for stent placement.   Cont iv abx  Will need outpt f/up with  for definite stone Rx.   Hx DM... diet controlled at home although she says she's supposed to be on meds.   Hx Hypothyroid... also not taking meds at home but restarted synthroid at 25.... repeat TFTs  weeks.   Dc home tomorrow morning pending  clearance.           11/21:  - rocephin 2 g; follow up culture  - no stones are clearly obstructing, but will do flomax  - pain control  - urology consulted --> if doesn't pass stone today, will have cysto tomorrow to remove it           Subjective   She is feeling better today but still with L abd/flank pain.        Objective     Physical Exam  Cardiovascular:      Rate and Rhythm: Normal rate and regular rhythm.      Heart sounds: Normal heart sounds.   Pulmonary:      Breath sounds: Normal breath sounds.   Abdominal:      General: Bowel sounds are normal.      Palpations: Abdomen is soft.   Musculoskeletal:         General: Normal range of motion.   Neurological:      General: No focal deficit present.      Mental Status: She is alert and oriented to person, place, and time.   Psychiatric:         Mood and Affect: Mood normal.         Last Recorded Vitals  Blood pressure (!) 168/110, pulse 86, temperature 36.2 °C (97.2 °F), temperature source Temporal, resp. rate 16, height 1.676 m (5' 5.98\"), weight 74.8 kg (164 lb 14.5 oz), SpO2 100 %.  Intake/Output last 3 Shifts:  I/O last 3 completed shifts:  In: 295 (3.9 mL/kg) [I.V.:295 (3.9 mL/kg)]  Out: - (0 mL/kg)   Weight: 74.8 kg                  I spent 40 minutes in the professional and overall care of this " patient.      Justice Aguilar MD

## 2023-11-22 NOTE — H&P
"Tabatha Pascal is a 68 y.o. female on day 2 of admission presenting with Pyelonephritis.        Subjective   NAEO. Pt awake, sitting at the edge of the bed, NAD. Patient having very minimal lower back pain. Denies any urinary symptoms, dysuria, incontinence, frequency. Patient denies any fevers, chills, SOB, N/V. Awaiting to be taken down for CT scan.               Objective   Physical Exam  Constitutional:       Appearance: Normal appearance.   HENT:      Mouth/Throat:      Mouth: Mucous membranes are moist.   Cardiovascular:      Rate and Rhythm: Normal rate and regular rhythm.      Pulses: Normal pulses.   Pulmonary:      Effort: Pulmonary effort is normal.      Breath sounds: Normal breath sounds.   Abdominal:      General: Bowel sounds are normal.      Palpations: Abdomen is soft.   Musculoskeletal:         General: Normal range of motion.      Comments: Generalized weakness   Skin:     Capillary Refill: Capillary refill takes less than 2 seconds.   Neurological:      Mental Status: She is alert and oriented to person, place, and time.            Last Recorded Vitals  Blood pressure 117/72, pulse 71, temperature 36.3 °C (97.4 °F), temperature source Temporal, resp. rate 16, height 1.676 m (5' 6\"), weight 74.8 kg (165 lb), SpO2 95 %.  Intake/Output last 3 Shifts:  I/O last 3 completed shifts:  In: 1260 (16.8 mL/kg) [P.O.:1260]  Out: - (0 mL/kg)   Weight: 74.8 kg      Relevant Results        Results for orders placed or performed during the hospital encounter of 11/19/23 (from the past 24 hour(s))   POCT GLUCOSE   Result Value Ref Range     POCT Glucose 135 (H) 74 - 99 mg/dL   Basic Metabolic Panel   Result Value Ref Range     Glucose 106 (H) 74 - 99 mg/dL     Sodium 138 136 - 145 mmol/L     Potassium 3.5 3.5 - 5.3 mmol/L     Chloride 110 (H) 98 - 107 mmol/L     Bicarbonate 18 (L) 21 - 32 mmol/L     Anion Gap 14 10 - 20 mmol/L     Urea Nitrogen 11 6 - 23 mg/dL     Creatinine 0.55 0.50 - 1.05 mg/dL     eGFR >90 >60 " mL/min/1.73m*2     Calcium 7.5 (L) 8.6 - 10.3 mg/dL   Protime-INR   Result Value Ref Range     Protime 13.4 (H) 9.8 - 12.8 seconds     INR 1.2 (H) 0.9 - 1.1   CBC   Result Value Ref Range     WBC 6.8 4.4 - 11.3 x10*3/uL     nRBC 0.0 0.0 - 0.0 /100 WBCs     RBC 4.38 4.00 - 5.20 x10*6/uL     Hemoglobin 11.4 (L) 12.0 - 16.0 g/dL     Hematocrit 36.9 36.0 - 46.0 %     MCV 84 80 - 100 fL     MCH 26.0 26.0 - 34.0 pg     MCHC 30.9 (L) 32.0 - 36.0 g/dL     RDW 15.1 (H) 11.5 - 14.5 %     Platelets 255 150 - 450 x10*3/uL   C-reactive protein   Result Value Ref Range     C-Reactive Protein 20.82 (H) <1.00 mg/dL   POCT GLUCOSE   Result Value Ref Range     POCT Glucose 97 74 - 99 mg/dL      Scheduled medications  cefTRIAXone, 2 g, intravenous, q24h  levothyroxine, 25 mcg, oral, Daily  melatonin, 3 mg, oral, Daily  pantoprazole, 40 mg, intravenous, Daily  polyethylene glycol, 17 g, oral, Daily  potassium chloride, 20 mEq, intravenous, Once  tamsulosin, 0.4 mg, oral, Daily        Continuous medications  sodium chloride, 100 mL/hr, Last Rate: 100 mL/hr (11/21/23 0849)        PRN medications  PRN medications: acetaminophen, acetaminophen, morphine, morphine, ondansetron, ondansetron ODT **OR** ondansetron  CT abdomen pelvis w IV contrast     Result Date: 11/19/2023  Interpreted By:  Lis Jackson, STUDY: CT ABDOMEN PELVIS W IV CONTRAST;  11/19/2023 7:09 pm   INDICATION: Signs/Symptoms:concern for pyelo.   COMPARISON: CT scan of the abdomen pelvis 06/11/2020   ACCESSION NUMBER(S): [id number]   ORDERING CLINICIAN: VEE WEBBER   TECHNIQUE: Axial CT images of the abdomen and pelvis with coronal and sagittal reconstructed images obtained after intravenous administration of 75 mL of Omnipaque 350.   FINDINGS: LOWER CHEST: Bibasilar atelectasis and or scarring. Large hiatal hernia with significant portion of the stomach intrathoracic in location.   ABDOMEN:   LIVER: Within normal limits. BILE DUCTS: Normal caliber.  GALLBLADDER: Cholelithiasis. PANCREAS: Within normal limits. SPLEEN: Within normal limits. ADRENALS: Within normal limits. KIDNEYS and URETERS: Bilateral kidneys demonstrate wedge-shaped areas of hypoenhancement with perinephric inflammatory stranding. Findings are concerning for acute pyelonephritis. No hydronephrosis or hydroureter. There is a 2-3 mm nonobstructing calculus in the interpolar region of the left kidney. A 4 mm nonobstructing calculus is noted in the left distal ureter. No evidence for right renal or ureteral calculi.   VESSELS:  Calcific atherosclerosis of the aortoiliac vessels. No aortic aneurysm. RETROPERITONEUM: No pathologically enlarged retroperitoneal lymph nodes.   PELVIS:   REPRODUCTIVE ORGANS: Uterus is present. No adnexal mass. BLADDER: Bladder is partially distended with mild wall thickening and several locules of air. Subtle perivesical stranding   BOWEL: Large hiatal hernia. Visualized loops of bowel are without evidence for obstruction. Moderate stool burden. Scattered colonic diverticula. No evidence for acute diverticulitis. No pneumatosis or portal venous gas. Normal appendix. PERITONEUM: No ascites or free air, no fluid collection.   ABDOMINAL WALL: Small umbilical hernia contains fat. Patulous inguinal canals containing fat. BONES: Multilevel degenerative changes of the spine. Grade 1 anterolisthesis of L4 on 5.        There are multiple peripheral wedge-shaped areas of hypoenhancement bilateral kidneys with perinephric inflammatory stranding. Mild urothelial thickening. Findings concerning for acute pyelonephritis.   There is a 3-4 mm nonobstructing calculus in the left distal ureter. Additional punctate 2-3 mm calculus is noted in the interpolar region of the left kidney.   Bladder is partially distended with mild wall thickening and subtle perivesical stranding. Subtle tiny locules of air are noted in the bladder. Correlate for history of any recent instrumentation. In the  absence of such history findings are concerning for infectious cystitis. Correlate with urinalysis.   Diverticulosis without evidence for acute diverticulitis.   Cholelithiasis.   Additional findings as described above.   MACRO: None   Signed by: Lis Jackson 11/19/2023 7:39 PM Dictation workstation:   [id number]     XR chest 2 views     Result Date: 11/19/2023  STUDY: Chest Radiographs;  11/19/2023, 3:18PM. INDICATION: Shortness of breath. COMPARISON: CXR 6/11/2020. ACCESSION NUMBER(S): [id number] ORDERING CLINICIAN: VEE WEBBER TECHNIQUE:  Frontal and lateral chest. FINDINGS: CARDIOMEDIASTINAL SILHOUETTE: Cardiomediastinal silhouette is normal in size and configuration.  LUNGS: Lungs are clear.  ABDOMEN: No remarkable upper abdominal findings.  BONES: No acute osseous changes.     No acute pulmonary pathology. Signed by Blaine Garcias MD               Assessment/Plan   Principal Problem:    Pyelonephritis  Active Problems:    Nephrolithiasis    Diabetes (CMS/HCC)    HTN (hypertension)    Hypothyroidism    68 YOF presenting with ml of not feeling well, fever, chills, urinary urgency. Patient is awake, alert, NAD, feeling better this morning.     Plan: Pyelonephritis/UTI/ non obstructing kidney stone    - CT scan today to eval stone> slight distal migration of 4mm distal left ureteral stone, now proximal to the UVJ. No hydronephrosis, now with large air-fluid level in the bladder  - Concern for emphysematous pyelo cystitis  - Continue antibiotics  - PRN pain control and antiemetics  - Afebrile, no leukocytosis, check AM labs  - Continue flomax, IVF  We had a very long and extensive discussion with the patient regarding his condition.  I discussed with the patient the pathophysiology, differential diagnosis, risk factor, management of ureteral stones.  Explained to the patient that the stone is most probably still present given their persistent pain and the recent CT.  I gave the patient 3  options of management including observation which I discouraged given their episode of fever, the size of the location of the stone.  Explained that they have less likely chance of spontaneous passage of the stone.  We also discussed ESWL which would be not appropriate for the size of the location of stone.  We discussed at length a left ureteroscopy, laser stone fragmentation, left retrograde pyelogram, left double-J stent insertion.  We discussed in detail the risk, benefit, potential complication, adverse events including hematuria, pneumaturia, pain, stent discomfort and pain, fever, chills, infection, urosepsis, I explained to the patient that most likely they need a second procedure at the first wound will be probably only a stent placement. I explained that the second procedure would be the actual laser stone fragmentation and exchange of their stent.

## 2023-11-22 NOTE — PROGRESS NOTES
Currently in surgery for Cystoscopy with Lithotripsy Laser Left Urethra Stent placement. Oncoming TCC to follow through hospital course for any changes in discharge plan.

## 2023-11-22 NOTE — ANESTHESIA PROCEDURE NOTES
Airway  Date/Time: 11/22/2023 12:53 PM  Urgency: elective    Airway not difficult    Staffing  Performed: JESUS   Authorized by: HUSEYIN Ward    Performed by: HUSEYIN Ward  Patient location during procedure: OR    Indications and Patient Condition  Indications for airway management: anesthesia      Final Airway Details  Final airway type: supraglottic airway      Successful airway: Supraglottic airway: igel.  Size 4

## 2023-11-22 NOTE — ANESTHESIA PREPROCEDURE EVALUATION
Patient: Tabatha Pascal    Procedure Information       Date/Time: 11/22/23 1200    Procedures:       Cystoscopy with Lithotripsy Laser (Left)      Cystoscopy with Retrograde Pyelogram (Left)    Location: [facility] OR 05 / [facility] OR    Surgeons: Lamine Abdi MD MPH            Relevant Problems   Anesthesia   (+) PONV (postoperative nausea and vomiting)      Cardiovascular   (+) HTN (hypertension)      Endocrine   (+) Hypothyroidism      /Renal   (+) Nephrolithiasis   (+) Pyelonephritis      Infectious Disease   (+) Pyelonephritis       Clinical information reviewed:   Tobacco  Allergies  Meds   Med Hx  Surg Hx   Fam Hx  Soc Hx        NPO/Void Status  Carbonhydrate Drink Given Prior to Surgery? : N  Date of Last Liquid: 11/22/23  Time of Last Liquid: 0000  Date of Last Solid: 11/21/23  Time of Last Solid: 1800  Last Intake Type: Clear fluids  Time of Last Void: 0900           Past Medical History:   Diagnosis Date    Diabetes mellitus (CMS/HCC)     GERD (gastroesophageal reflux disease)     Glaucoma     HTN (hypertension)     Hypothyroidism     Nephrolithiasis       Past Surgical History:   Procedure Laterality Date    CYSTOSCOPY      ESOPHAGOGASTRODUODENOSCOPY      PATELLA FRACTURE SURGERY Bilateral      Social History     Tobacco Use    Smoking status: Never    Smokeless tobacco: Never      No current outpatient medications   No Known Allergies     Chemistry    Lab Results   Component Value Date/Time     11/22/2023 0639    K 4.0 11/22/2023 0639     11/22/2023 0639    CO2 22 11/22/2023 0639    BUN 10 11/22/2023 0639    CREATININE 0.52 11/22/2023 0639    Lab Results   Component Value Date/Time    CALCIUM 7.2 (L) 11/22/2023 0639    ALKPHOS 78 11/19/2023 1513    AST 12 11/19/2023 1513    ALT 9 11/19/2023 1513    BILITOT 0.9 11/19/2023 1513          Lab Results   Component Value Date/Time    WBC 6.2 11/22/2023 0639    HGB 10.8 (L) 11/22/2023 0639    HCT 35.0 (L) 11/22/2023 0639      "11/22/2023 0639     Lab Results   Component Value Date/Time    PROTIME 13.4 (H) 11/21/2023 0545    INR 1.2 (H) 11/21/2023 0545     No results found for this or any previous visit (from the past 4464 hour(s)).  No results found for this or any previous visit from the past 1095 days.       Visit Vitals  /82 (Patient Position: Lying)   Pulse 80   Temp 36.1 °C (97 °F) (Temporal)   Resp 16   Ht 1.676 m (5' 5.98\")   Wt 74.8 kg (164 lb 14.5 oz)   SpO2 95%   BMI 26.63 kg/m²   Smoking Status Never   BSA 1.87 m²        Physical Exam    Airway  Mallampati: III  TM distance: >3 FB  Neck ROM: full     Cardiovascular   Rhythm: regular  Rate: normal     Dental - normal exam     Pulmonary   Breath sounds clear to auscultation     Abdominal - normal exam              Anesthesia Plan    ASA 3     general   (General with LMA, emend for PONV, has worked for her in past)  intravenous induction   Postoperative administration of opioids is intended.  Trial extubation is planned.  Anesthetic plan and risks discussed with patient.    Plan discussed with CRNA and CAA.        "

## 2023-11-22 NOTE — ANESTHESIA POSTPROCEDURE EVALUATION
Patient: Tabatha Pascal    Procedure Summary       Date: 11/22/23 Room / Location: [facility] OR 05 / [facility] OR    Anesthesia Start: 1237 Anesthesia Stop: 1335    Procedures:       Cystoscopy with Lithotripsy Laser, stent placement (Left: Urethra)      Cystoscopy with Retrograde Pyelogram (Left: Urethra) Diagnosis:       Left ureteral stone      (Left ureteral stone [N20.1])    Surgeons: Lamine Abdi MD MPH Responsible Provider: Hector Valentine MD    Anesthesia Type: general ASA Status: 3            Anesthesia Type: general    Vitals Value Taken Time   /80 11/22/23 1431   Temp 36.2 °C (97.2 °F) 11/22/23 1336   Pulse 74 11/22/23 1433   Resp 15 11/22/23 1430   SpO2 95 % 11/22/23 1433   Vitals shown include unvalidated device data.    Anesthesia Post Evaluation    Patient location during evaluation: bedside  Patient participation: complete - patient participated  Level of consciousness: awake and alert  Pain management: satisfactory to patient  Airway patency: patent  Cardiovascular status: acceptable  Respiratory status: acceptable  Hydration status: acceptable  Postoperative Nausea and Vomiting: none  Comments: No apparent complications.        No notable events documented.

## 2023-11-23 VITALS
RESPIRATION RATE: 16 BRPM | TEMPERATURE: 97.5 F | DIASTOLIC BLOOD PRESSURE: 94 MMHG | OXYGEN SATURATION: 96 % | BODY MASS INDEX: 26.5 KG/M2 | HEIGHT: 66 IN | HEART RATE: 84 BPM | WEIGHT: 164.9 LBS | SYSTOLIC BLOOD PRESSURE: 157 MMHG

## 2023-11-23 LAB — GLUCOSE BLD MANUAL STRIP-MCNC: 136 MG/DL (ref 74–99)

## 2023-11-23 PROCEDURE — 2500000001 HC RX 250 WO HCPCS SELF ADMINISTERED DRUGS (ALT 637 FOR MEDICARE OP): Performed by: HOSPITALIST

## 2023-11-23 PROCEDURE — 2500000004 HC RX 250 GENERAL PHARMACY W/ HCPCS (ALT 636 FOR OP/ED): Performed by: INTERNAL MEDICINE

## 2023-11-23 PROCEDURE — 82947 ASSAY GLUCOSE BLOOD QUANT: CPT

## 2023-11-23 PROCEDURE — 99239 HOSP IP/OBS DSCHRG MGMT >30: CPT | Performed by: INTERNAL MEDICINE

## 2023-11-23 RX ORDER — OXYBUTYNIN CHLORIDE 5 MG/1
5 TABLET ORAL 2 TIMES DAILY
Qty: 60 TABLET | Refills: 0 | Status: SHIPPED | OUTPATIENT
Start: 2023-11-23 | End: 2023-12-23

## 2023-11-23 RX ORDER — AMLODIPINE BESYLATE 2.5 MG/1
2.5 TABLET ORAL DAILY
Qty: 30 TABLET | Refills: 0 | Status: SHIPPED | OUTPATIENT
Start: 2023-11-23 | End: 2023-12-23

## 2023-11-23 RX ORDER — LEVOTHYROXINE SODIUM 25 UG/1
25 TABLET ORAL DAILY
Qty: 30 TABLET | Refills: 1 | Status: SHIPPED | OUTPATIENT
Start: 2023-11-24 | End: 2024-01-23

## 2023-11-23 RX ORDER — CIPROFLOXACIN 500 MG/1
500 TABLET ORAL 2 TIMES DAILY
Qty: 14 TABLET | Refills: 0 | Status: SHIPPED | OUTPATIENT
Start: 2023-11-23 | End: 2023-11-30

## 2023-11-23 RX ADMIN — LEVOTHYROXINE SODIUM 25 MCG: 0.03 TABLET ORAL at 05:37

## 2023-11-23 RX ADMIN — TAMSULOSIN HYDROCHLORIDE 0.4 MG: 0.4 CAPSULE ORAL at 08:34

## 2023-11-23 RX ADMIN — ACETAMINOPHEN 650 MG: 325 TABLET ORAL at 05:37

## 2023-11-23 ASSESSMENT — PAIN SCALES - GENERAL
PAINLEVEL_OUTOF10: 4
PAINLEVEL_OUTOF10: 5 - MODERATE PAIN

## 2023-11-23 ASSESSMENT — PAIN - FUNCTIONAL ASSESSMENT: PAIN_FUNCTIONAL_ASSESSMENT: 0-10

## 2023-11-23 NOTE — NURSING NOTE
Discharge instructions discussed with pt and pt's family. Pt verbalizes instructions and follow up. Pt verbalizes when to call doctor and when to return to hospital. IV removed.

## 2023-11-23 NOTE — DISCHARGE SUMMARY
Discharge Diagnosis  Pyelonephritis      Discharge Meds     Your medication list        START taking these medications        Instructions Last Dose Given Next Dose Due   amLODIPine 2.5 mg tablet  Commonly known as: Norvasc      Take 1 tablet (2.5 mg) by mouth once daily.       ciprofloxacin 500 mg tablet  Commonly known as: Cipro      Take 1 tablet (500 mg) by mouth 2 times a day for 7 days.       levothyroxine 25 mcg tablet  Commonly known as: Synthroid, Levoxyl  Start taking on: November 24, 2023      Take 1 tablet (25 mcg) by mouth early in the morning.. Do not start before November 24, 2023.       oxybutynin 5 mg tablet  Commonly known as: Ditropan      Take 1 tablet (5 mg) by mouth 2 times a day.                 Where to Get Your Medications        These medications were sent to [id number] [address] [address]      Phone: [phone]   amLODIPine 2.5 mg tablet  ciprofloxacin 500 mg tablet  levothyroxine 25 mcg tablet  oxybutynin 5 mg tablet         Test Results Pending At Discharge  Pending Labs       Order Current Status    Extra Urine Gray Tube Collected (11/19/23 1624)    Calculi (Stone) Analysis In process    Urinalysis with Reflex Microscopic and Culture In process            Hospital Course       11/23:  Urine cx with E coli, pt feels well, no pain, afebrile... will dc with cipro regimen to complete, Pt instructed to f/up with urology in 2 weeks.   HTN... not taking meds at home... started on norvasc 2.5 and she will f/up with pcp.   Synthroid scripted.     Pt clinically and hemodynamically stable, tolerating PO intake and room air.   Instructed to F/U with PCP within 5 days.   She understands and agrees with the dc plan.     More than 30 min spent on dc.         11/22:  To the OR with  today for stent placement.   Cont iv abx  Will need outpt f/up with  for definite stone Rx.   Hx DM... diet controlled at home although she says  she's supposed to be on meds.   Hx Hypothyroid... also not taking meds at home but restarted synthroid at 25.... repeat TFTs  weeks.   Dc home tomorrow morning pending  clearance.               11/21:  - rocephin 2 g; follow up culture  - no stones are clearly obstructing, but will do flomax  - pain control  - urology consulted --> if doesn't pass stone today, will have cysto tomorrow to remove it    Pertinent Physical Exam At Time of Discharge  Physical Exam    Outpatient Follow-Up  No future appointments.      Justice Aguilar MD

## 2023-11-27 LAB
APPEARANCE STONE: NORMAL
COMPN STONE: NORMAL
SPECIMEN WT: 5 MG

## 2024-04-29 ENCOUNTER — APPOINTMENT (OUTPATIENT)
Dept: RADIOLOGY | Facility: HOSPITAL | Age: 69
End: 2024-04-29
Payer: MEDICARE

## 2024-04-29 ENCOUNTER — HOSPITAL ENCOUNTER (EMERGENCY)
Facility: HOSPITAL | Age: 69
Discharge: HOME | End: 2024-04-29
Payer: MEDICARE

## 2024-04-29 VITALS
DIASTOLIC BLOOD PRESSURE: 122 MMHG | BODY MASS INDEX: 26.52 KG/M2 | OXYGEN SATURATION: 99 % | HEART RATE: 78 BPM | RESPIRATION RATE: 16 BRPM | TEMPERATURE: 98.1 F | SYSTOLIC BLOOD PRESSURE: 174 MMHG | WEIGHT: 165 LBS | HEIGHT: 66 IN

## 2024-04-29 DIAGNOSIS — S89.91XA KNEE INJURY, RIGHT, INITIAL ENCOUNTER: Primary | ICD-10-CM

## 2024-04-29 DIAGNOSIS — N39.0 URINARY TRACT INFECTION WITHOUT HEMATURIA, SITE UNSPECIFIED: ICD-10-CM

## 2024-04-29 LAB
APPEARANCE UR: CLEAR
BILIRUB UR STRIP.AUTO-MCNC: NEGATIVE MG/DL
COLOR UR: YELLOW
GLUCOSE UR STRIP.AUTO-MCNC: ABNORMAL MG/DL
KETONES UR STRIP.AUTO-MCNC: NEGATIVE MG/DL
LEUKOCYTE ESTERASE UR QL STRIP.AUTO: ABNORMAL
MUCOUS THREADS #/AREA URNS AUTO: ABNORMAL /LPF
NITRITE UR QL STRIP.AUTO: NEGATIVE
PH UR STRIP.AUTO: 5 [PH]
PROT UR STRIP.AUTO-MCNC: ABNORMAL MG/DL
RBC # UR STRIP.AUTO: ABNORMAL /UL
RBC #/AREA URNS AUTO: >20 /HPF
SP GR UR STRIP.AUTO: 1.03
SQUAMOUS #/AREA URNS AUTO: ABNORMAL /HPF
UROBILINOGEN UR STRIP.AUTO-MCNC: NORMAL MG/DL
WBC #/AREA URNS AUTO: >50 /HPF

## 2024-04-29 PROCEDURE — 73562 X-RAY EXAM OF KNEE 3: CPT | Mod: RIGHT SIDE | Performed by: RADIOLOGY

## 2024-04-29 PROCEDURE — 99283 EMERGENCY DEPT VISIT LOW MDM: CPT

## 2024-04-29 PROCEDURE — 73562 X-RAY EXAM OF KNEE 3: CPT | Mod: RT

## 2024-04-29 PROCEDURE — 81001 URINALYSIS AUTO W/SCOPE: CPT | Performed by: PHYSICIAN ASSISTANT

## 2024-04-29 PROCEDURE — 87086 URINE CULTURE/COLONY COUNT: CPT | Mod: AHULAB | Performed by: PHYSICIAN ASSISTANT

## 2024-04-29 RX ORDER — CEPHALEXIN 500 MG/1
500 CAPSULE ORAL 3 TIMES DAILY
Qty: 21 CAPSULE | Refills: 0 | Status: SHIPPED | OUTPATIENT
Start: 2024-04-29 | End: 2024-05-06

## 2024-04-29 RX ORDER — ACETAMINOPHEN 325 MG/1
975 TABLET ORAL ONCE
Status: COMPLETED | OUTPATIENT
Start: 2024-04-29 | End: 2024-04-29

## 2024-04-29 RX ADMIN — ACETAMINOPHEN 975 MG: 325 TABLET ORAL at 12:49

## 2024-04-29 ASSESSMENT — COLUMBIA-SUICIDE SEVERITY RATING SCALE - C-SSRS
6. HAVE YOU EVER DONE ANYTHING, STARTED TO DO ANYTHING, OR PREPARED TO DO ANYTHING TO END YOUR LIFE?: NO
2. HAVE YOU ACTUALLY HAD ANY THOUGHTS OF KILLING YOURSELF?: NO
1. IN THE PAST MONTH, HAVE YOU WISHED YOU WERE DEAD OR WISHED YOU COULD GO TO SLEEP AND NOT WAKE UP?: NO

## 2024-04-29 ASSESSMENT — PAIN - FUNCTIONAL ASSESSMENT
PAIN_FUNCTIONAL_ASSESSMENT: 0-10
PAIN_FUNCTIONAL_ASSESSMENT: 0-10

## 2024-04-29 ASSESSMENT — PAIN SCALES - GENERAL
PAINLEVEL_OUTOF10: 5 - MODERATE PAIN
PAINLEVEL_OUTOF10: 5 - MODERATE PAIN

## 2024-04-29 NOTE — ED PROVIDER NOTES
HPI   Chief Complaint   Patient presents with    Knee Injury       68-year-old female with chronic right knee pain.  Today her 9-month-old grandchild kicked her.  She describes a possible patellar dislocation that she self reduced prior to arrival.  She does have some new swelling anterior knee.  Pain is worse than usual.  There is no fall or other impact injury.  Patient is also concerned about some urinary frequency without dysuria hematuria.  No abdominal or back pain.  No fever or systemic symptoms.      History provided by:  Patient   used: No                        No data recorded                   Patient History   Past Medical History:   Diagnosis Date    Blindness of left eye     Diabetes mellitus (Multi)     GERD (gastroesophageal reflux disease)     Glaucoma     HTN (hypertension)     Hypothyroidism     Nephrolithiasis      Past Surgical History:   Procedure Laterality Date    CYSTOSCOPY      ESOPHAGOGASTRODUODENOSCOPY      PATELLA FRACTURE SURGERY Bilateral      No family history on file.  Social History     Tobacco Use    Smoking status: Never    Smokeless tobacco: Never   Substance Use Topics    Alcohol use: Not on file    Drug use: Not on file       Physical Exam   ED Triage Vitals [04/29/24 1118]   Temperature Heart Rate Respirations BP   36.7 °C (98.1 °F) 99 15 (!) 166/109      Pulse Ox Temp src Heart Rate Source Patient Position   99 % -- -- --      BP Location FiO2 (%)     -- --       Physical Exam  Vitals and nursing note reviewed.   Constitutional:       General: She is not in acute distress.     Appearance: Normal appearance. She is not ill-appearing, toxic-appearing or diaphoretic.   Abdominal:      Palpations: Abdomen is soft.      Tenderness: There is no abdominal tenderness. There is no right CVA tenderness or left CVA tenderness.   Musculoskeletal:         General: Swelling and tenderness present. No deformity. Normal range of motion.      Right lower leg: No edema.       Left lower leg: No edema.      Comments: Right knee with mild suprapatellar swelling.  Patella is mobile without crepitus deformity.  Extensor mechanism intact with full extension with minimal discomfort.  She is able to flex the knee with some discomfort.  Joint is stable.  No bony deformity.  Neurovascular intact distally.  No motor or sensory deficits.  Patient is ambulatory   Skin:     General: Skin is warm and dry.   Neurological:      General: No focal deficit present.      Mental Status: She is alert and oriented to person, place, and time.   Psychiatric:         Mood and Affect: Mood normal.         Behavior: Behavior normal.         Thought Content: Thought content normal.         Judgment: Judgment normal.       ED Course & MDM   ED Course as of 04/29/24 1446   Mon Apr 29, 2024   1437 X-ray report reviewed: Degenerative changes.  No effusion.  No fracture or dislocation.  Urinalysis 50 white cells 20 red cells positive excite esterase.  Negative nitrate. [GA]      ED Course User Index  [GA] Nilay Fung PA-C         Diagnoses as of 04/29/24 1446   Knee injury, right, initial encounter   Urinary tract infection without hematuria, site unspecified     XR knee right 3 views   Final Result   1. No acute fracture or dislocation.   2. Degenerative changes, as described above.        MACRO:   None.        Signed by: Renato Smart 4/29/2024 1:03 PM   Dictation workstation:   [id number]         Labs Reviewed   URINALYSIS WITH REFLEX CULTURE AND MICROSCOPIC - Abnormal       Result Value    Color, Urine Yellow      Appearance, Urine Clear      Specific Gravity, Urine 1.027      pH, Urine 5.0      Protein, Urine 20 (TRACE)      Glucose, Urine 100 (1+) (*)     Blood, Urine 0.1 (1+) (*)     Ketones, Urine NEGATIVE      Bilirubin, Urine NEGATIVE      Urobilinogen, Urine Normal      Nitrite, Urine NEGATIVE      Leukocyte Esterase, Urine 500 Chris/µL (*)    MICROSCOPIC ONLY, URINE - Abnormal    WBC, Urine >50 (*)      RBC, Urine >20 (*)     Squamous Epithelial Cells, Urine 1-9 (SPARSE)      Mucus, Urine 1+     URINE CULTURE   URINALYSIS WITH REFLEX CULTURE AND MICROSCOPIC    Narrative:     The following orders were created for panel order Urinalysis with Reflex Culture and Microscopic.  Procedure                               Abnormality         Status                     ---------                               -----------         ------                     Urinalysis with Reflex C...[[id number]]  Abnormal            Final result               Extra Urine Gray Tube[[id number]]                            In process                   Please view results for these tests on the individual orders.   EXTRA URINE GRAY TUBE        Medical Decision Making  Acute exacerbation chronic knee pain.  Minor injury with possible patellar dislocation self reduced prior to arrival.  X-ray: No fracture dislocation.  No effusion  Urinary frequency concerns  Urinalysis: Concerning for UTI.    Evaluation concerning for left knee injury possible self reduced patellar dislocation.  Knee immobilizer.  May use cane or walker to assist ambulation.  Tylenol for pain.  Possible UTI with urinary frequency urgency.  Prescribed Keflex.  Culture pending.    Stable for discharge outpatient management follow-up.  She has an orthopedist that she is following up with her knee pain.  Follow-up PCP for urinary symptoms.    Amount and/or Complexity of Data Reviewed  Labs: ordered. Decision-making details documented in ED Course.     Details: As above  Radiology: ordered. Decision-making details documented in ED Course.     Details: As above    Risk  Prescription drug management.        Procedure  Procedures     Nilay Fung PA-C  04/29/24 2113

## 2024-04-30 LAB — HOLD SPECIMEN: NORMAL

## 2024-05-01 LAB — BACTERIA UR CULT: ABNORMAL

## 2024-05-02 ENCOUNTER — TELEPHONE (OUTPATIENT)
Dept: PHARMACY | Facility: HOSPITAL | Age: 69
End: 2024-05-02
Payer: MEDICARE

## 2024-05-02 NOTE — PROGRESS NOTES
EDPD Note: Dose Change    Contacted [facility] Pascal regarding positive urine culture that was taken during their recent emergency room visit. I completed education with patient. The patient is being treated with the proper medication; however, the dose of the discharge prescription is incorrect. I gave verbal education about the new medication dose found below. No further follow up needed from EDPD Team. Patient was seen in the ED for a knee injury and urinary frequency. She reports that her urinary symptoms are improved. Instructed patient to decrease frequency to BID x 7 days.     Drug: Cephalexin 500 mg capsule  Sig: Take 1 capsule by mouth 2 times a day for 7 days.   Quantity: 14  Days Supply: 7    Susceptibility data from last 90 days.  Collected Specimen Info Organism Ampicillin Cefazolin Cefazolin (uncomplicated UTIs only) Ciprofloxacin Gentamicin Nitrofurantoin Piperacillin/Tazobactam Trimethoprim/Sulfamethoxazole   04/29/24 Urine from Clean Catch/Voided Escherichia coli S S S S S S S S        Beth Doty, PharmD

## 2024-05-02 NOTE — PROGRESS NOTES
EDPD Note: Dose Change    Contacted Tabatha Pascla regarding positive urine culture that was taken during their recent emergency room visit. I completed education with patient. The patient is being treated with the proper medication; however, the dose of the discharge prescription is incorrect. I gave verbal education about the new medication dose found below. No further follow up needed from EDPD Team. Patient was seen in the ED for a knee injury and urinary frequency. She was discharged with cephalexin 500 mg TID x 7 days. She reports that her urinary symptoms are improved. Instructed patient to decrease frequency to BID x 7 days.     Drug: Cephalexin 500 mg capsule  Sig: Take 1 capsule by mouth 2 times a day for 7 days.   Quantity: 14  Days Supply: 7    Susceptibility data from last 90 days.  Collected Specimen Info Organism Ampicillin Cefazolin Cefazolin (uncomplicated UTIs only) Ciprofloxacin Gentamicin Nitrofurantoin Piperacillin/Tazobactam Trimethoprim/Sulfamethoxazole   04/29/24 Urine from Clean Catch/Voided Escherichia coli S S S S S S S S        Beth Doty, PharmD

## (undated) DEVICE — SHEATH, URETERAL ACCESS, NAVIGATOR 11/13FR X 36CM

## (undated) DEVICE — COLLECTION BAG, FLUID, NON-STERILE

## (undated) DEVICE — GLOVE, SURGICAL, PROTEXIS PI , 7.5, PF, LF

## (undated) DEVICE — BAG, PRESSURE INFUSER, 3000 CC, LF

## (undated) DEVICE — GUIDEWIRE, SENSOR, DUAL FLEX, .038X150CM STRAIGHT

## (undated) DEVICE — TOWEL, SURGICAL, NEURO, O/R, 16 X 26, BLUE, STERILE

## (undated) DEVICE — ADAPTER, UROLOK

## (undated) DEVICE — CATHETER, DUAL LUMEN, UDC/10/50 M

## (undated) DEVICE — BASKET, RETRIEVAL, ZERO TIP AWAY, 12MM/120CM

## (undated) DEVICE — Device

## (undated) DEVICE — BASKET, RETRIEVAL, DAKOTA, 1.9F X 11MM X 120CM